# Patient Record
Sex: MALE | Race: WHITE | Employment: OTHER
[De-identification: names, ages, dates, MRNs, and addresses within clinical notes are randomized per-mention and may not be internally consistent; named-entity substitution may affect disease eponyms.]

---

## 2024-08-14 ENCOUNTER — TELEPHONE (OUTPATIENT)
Dept: FAMILY MEDICINE CLINIC | Facility: CLINIC | Age: 69
End: 2024-08-14

## 2024-08-14 NOTE — TELEPHONE ENCOUNTER
Spoke to patient, he chose to keep appt on 8/19 with Maritza as Dr. Gonzales is booking in October

## 2024-08-14 NOTE — TELEPHONE ENCOUNTER
----- Message from Mila BRICENO sent at 8/13/2024  3:44 PM EDT -----  Regarding: [facility] Appointment Request  E[facility] Appointment Request    Patient needs appointment for E[facility] Appointment Type: New to Provider.    Patient Requested Dates(s): as soon as possible   Patient Requested Time: 11:00 Morning   Provider Name: Lety Gonzales MD    Reason for Appointment Request: New Patient - No appointments available during search,patient want to book an appointment for new patient to establish care under doctor  Lety Gonzales MD  Just permanent PCP   --------------------------------------------------------------------------------------------------------------------------    Relationship to Patient: Covered Entity Sister Ms : cuate     Call Back Information: OK to leave message on voicemail  Preferred Call Back Number: [phone]

## 2024-08-28 ENCOUNTER — OFFICE VISIT (OUTPATIENT)
Dept: FAMILY MEDICINE CLINIC | Facility: CLINIC | Age: 69
End: 2024-08-28

## 2024-08-28 VITALS
HEIGHT: 68 IN | SYSTOLIC BLOOD PRESSURE: 133 MMHG | DIASTOLIC BLOOD PRESSURE: 78 MMHG | WEIGHT: 144.25 LBS | HEART RATE: 94 BPM | TEMPERATURE: 98.4 F | OXYGEN SATURATION: 95 % | BODY MASS INDEX: 21.86 KG/M2

## 2024-08-28 DIAGNOSIS — Z79.01 CURRENT USE OF LONG TERM ANTICOAGULATION: ICD-10-CM

## 2024-08-28 DIAGNOSIS — I10 ESSENTIAL HYPERTENSION: ICD-10-CM

## 2024-08-28 DIAGNOSIS — E78.2 MIXED HYPERLIPIDEMIA: ICD-10-CM

## 2024-08-28 DIAGNOSIS — N18.4 CKD (CHRONIC KIDNEY DISEASE) STAGE 4, GFR 15-29 ML/MIN (HCC): ICD-10-CM

## 2024-08-28 DIAGNOSIS — Z86.73 HISTORY OF CVA (CEREBROVASCULAR ACCIDENT) WITHOUT RESIDUAL DEFICITS: ICD-10-CM

## 2024-08-28 DIAGNOSIS — G57.93 NEUROPATHY OF BOTH FEET: ICD-10-CM

## 2024-08-28 DIAGNOSIS — N64.4 BREAST PAIN, LEFT: ICD-10-CM

## 2024-08-28 DIAGNOSIS — E11.22 CONTROLLED TYPE 2 DIABETES MELLITUS WITH STAGE 4 CHRONIC KIDNEY DISEASE, WITHOUT LONG-TERM CURRENT USE OF INSULIN (HCC): Primary | ICD-10-CM

## 2024-08-28 DIAGNOSIS — L97.909 ULCER OF LOWER EXTREMITY, UNSPECIFIED LATERALITY, UNSPECIFIED ULCER STAGE (HCC): ICD-10-CM

## 2024-08-28 DIAGNOSIS — E79.0 HYPERURICEMIA: ICD-10-CM

## 2024-08-28 DIAGNOSIS — I82.409 RECURRENT ACUTE DEEP VEIN THROMBOSIS (DVT) OF LOWER EXTREMITY, UNSPECIFIED LATERALITY (HCC): ICD-10-CM

## 2024-08-28 DIAGNOSIS — F17.209 TOBACCO USE DISORDER, CONTINUOUS: ICD-10-CM

## 2024-08-28 DIAGNOSIS — N62 GYNECOMASTIA: ICD-10-CM

## 2024-08-28 DIAGNOSIS — Z79.84 LONG TERM (CURRENT) USE OF ORAL HYPOGLYCEMIC DRUGS: ICD-10-CM

## 2024-08-28 DIAGNOSIS — N18.4 CONTROLLED TYPE 2 DIABETES MELLITUS WITH STAGE 4 CHRONIC KIDNEY DISEASE, WITHOUT LONG-TERM CURRENT USE OF INSULIN (HCC): Primary | ICD-10-CM

## 2024-08-28 DIAGNOSIS — G47.62 NOCTURNAL LEG CRAMPS: ICD-10-CM

## 2024-08-28 LAB
BASOPHILS # BLD: 0.1 K/UL (ref 0–0.2)
BASOPHILS NFR BLD: 1 % (ref 0–2)
DIFFERENTIAL METHOD BLD: ABNORMAL
EOSINOPHIL # BLD: 0.1 K/UL (ref 0–0.8)
EOSINOPHIL NFR BLD: 1 % (ref 0.5–7.8)
ERYTHROCYTE [DISTWIDTH] IN BLOOD BY AUTOMATED COUNT: 16.7 % (ref 11.9–14.6)
EST. AVERAGE GLUCOSE BLD GHB EST-MCNC: 141 MG/DL
HBA1C MFR BLD: 6.5 % (ref 0–5.6)
HCT VFR BLD AUTO: 43.2 % (ref 41.1–50.3)
HGB BLD-MCNC: 13.6 G/DL (ref 13.6–17.2)
IMM GRANULOCYTES # BLD AUTO: 0 K/UL (ref 0–0.5)
IMM GRANULOCYTES NFR BLD AUTO: 0 % (ref 0–5)
LYMPHOCYTES # BLD: 1.9 K/UL (ref 0.5–4.6)
LYMPHOCYTES NFR BLD: 26 % (ref 13–44)
MAGNESIUM SERPL-MCNC: 2.3 MG/DL (ref 1.8–2.4)
MCH RBC QN AUTO: 29.2 PG (ref 26.1–32.9)
MCHC RBC AUTO-ENTMCNC: 31.5 G/DL (ref 31.4–35)
MCV RBC AUTO: 92.9 FL (ref 82–102)
MONOCYTES # BLD: 0.5 K/UL (ref 0.1–1.3)
MONOCYTES NFR BLD: 7 % (ref 4–12)
NEUTS SEG # BLD: 4.9 K/UL (ref 1.7–8.2)
NEUTS SEG NFR BLD: 65 % (ref 43–78)
NRBC # BLD: 0 K/UL (ref 0–0.2)
PLATELET # BLD AUTO: 219 K/UL (ref 150–450)
PMV BLD AUTO: 10.3 FL (ref 9.4–12.3)
RBC # BLD AUTO: 4.65 M/UL (ref 4.23–5.6)
URATE SERPL-MCNC: 7.3 MG/DL (ref 3.9–8.2)
VIT B12 SERPL-MCNC: 373 PG/ML (ref 193–986)
WBC # BLD AUTO: 7.5 K/UL (ref 4.3–11.1)

## 2024-08-28 RX ORDER — SITAGLIPTIN 50 MG/1
50 TABLET, FILM COATED ORAL DAILY
COMMUNITY
Start: 2024-07-23

## 2024-08-28 RX ORDER — DAPAGLIFLOZIN 5 MG/1
5 TABLET, FILM COATED ORAL DAILY
COMMUNITY
Start: 2024-06-21

## 2024-08-28 RX ORDER — LISINOPRIL 2.5 MG/1
2.5 TABLET ORAL DAILY
COMMUNITY

## 2024-08-28 RX ORDER — ALLOPURINOL 100 MG/1
100 TABLET ORAL DAILY
COMMUNITY

## 2024-08-28 RX ORDER — TRAZODONE HYDROCHLORIDE 50 MG/1
50 TABLET, FILM COATED ORAL NIGHTLY
COMMUNITY

## 2024-08-28 RX ORDER — APIXABAN 2.5 MG/1
2.5 TABLET, FILM COATED ORAL 2 TIMES DAILY
COMMUNITY
Start: 2024-08-12

## 2024-08-28 RX ORDER — ATORVASTATIN CALCIUM 80 MG/1
80 TABLET, FILM COATED ORAL DAILY
COMMUNITY

## 2024-08-28 SDOH — ECONOMIC STABILITY: INCOME INSECURITY: HOW HARD IS IT FOR YOU TO PAY FOR THE VERY BASICS LIKE FOOD, HOUSING, MEDICAL CARE, AND HEATING?: NOT HARD AT ALL

## 2024-08-28 SDOH — ECONOMIC STABILITY: FOOD INSECURITY: WITHIN THE PAST 12 MONTHS, YOU WORRIED THAT YOUR FOOD WOULD RUN OUT BEFORE YOU GOT MONEY TO BUY MORE.: NEVER TRUE

## 2024-08-28 SDOH — ECONOMIC STABILITY: FOOD INSECURITY: WITHIN THE PAST 12 MONTHS, THE FOOD YOU BOUGHT JUST DIDN'T LAST AND YOU DIDN'T HAVE MONEY TO GET MORE.: NEVER TRUE

## 2024-08-28 ASSESSMENT — PATIENT HEALTH QUESTIONNAIRE - PHQ9
7. TROUBLE CONCENTRATING ON THINGS, SUCH AS READING THE NEWSPAPER OR WATCHING TELEVISION: NOT AT ALL
6. FEELING BAD ABOUT YOURSELF - OR THAT YOU ARE A FAILURE OR HAVE LET YOURSELF OR YOUR FAMILY DOWN: NOT AT ALL
10. IF YOU CHECKED OFF ANY PROBLEMS, HOW DIFFICULT HAVE THESE PROBLEMS MADE IT FOR YOU TO DO YOUR WORK, TAKE CARE OF THINGS AT HOME, OR GET ALONG WITH OTHER PEOPLE: SOMEWHAT DIFFICULT
SUM OF ALL RESPONSES TO PHQ QUESTIONS 1-9: 6
5. POOR APPETITE OR OVEREATING: NOT AT ALL
1. LITTLE INTEREST OR PLEASURE IN DOING THINGS: NEARLY EVERY DAY
SUM OF ALL RESPONSES TO PHQ QUESTIONS 1-9: 6
9. THOUGHTS THAT YOU WOULD BE BETTER OFF DEAD, OR OF HURTING YOURSELF: NOT AT ALL
SUM OF ALL RESPONSES TO PHQ QUESTIONS 1-9: 6
8. MOVING OR SPEAKING SO SLOWLY THAT OTHER PEOPLE COULD HAVE NOTICED. OR THE OPPOSITE, BEING SO FIGETY OR RESTLESS THAT YOU HAVE BEEN MOVING AROUND A LOT MORE THAN USUAL: NOT AT ALL
2. FEELING DOWN, DEPRESSED OR HOPELESS: MORE THAN HALF THE DAYS
3. TROUBLE FALLING OR STAYING ASLEEP: NOT AT ALL
4. FEELING TIRED OR HAVING LITTLE ENERGY: SEVERAL DAYS
SUM OF ALL RESPONSES TO PHQ QUESTIONS 1-9: 6
SUM OF ALL RESPONSES TO PHQ9 QUESTIONS 1 & 2: 5

## 2024-08-28 NOTE — PROGRESS NOTES
Chief Complaint   Patient presents with    New Patient     Wants to establish, numbness of right foot & left side toes x 6 months, lump in left breast-mammogram negative, painful when he bumps it       HISTORY OF PRESENT ILLNESS:  Sin Narayan is a very pleasant 69 y.o. male who presents as a new patient to establish care and follow up on several chronic medical conditions, including:     Type 2 DM- last A1C unknown. He is currently taking januvia and farxiga. He does not monitor glucose at home. Denies symptoms of hypoglycemia. He is overdue for eye exam. Endorses neuropathy symptoms in both feet, which he reports started earlier this year. He has CKD stage 4, which is managed by nephrology. 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 He is on long term anticoagulant therapy with eliquis. Reports having vein stripping on both legs for varicose veins.     Hypertension- stable. BP today in the office was 133/78. He is prescribed lisinopril and amlodipine but is only taking lisinopril currently as he was unsure about his prescribed medications. He denies CP, ENCINAS/SOB, palpitations, lower extremity edema, dizziness, syncopal episodes, regular ETOH use. He had a CVA in 2019 due to embolism of right middle cerebral artery. No residual deficits and is compliant with statin therapy and also on eliquis. He is a daily smoker.    Gout- has affected wrists in the past. He has been non-compliant with allopurinol therapy due to confusion regarding his medications. No gout flares in about 2 years. No hx of kidney stones    4. Breast pain- left- has gynecomastia. No change in size, nipple discharge, rashes. Mammogram performed in 01/22 and demonstrated bilateral gynecomastia. Not taking any medications known to cause gynecomastia. No drug use other than prescribed morphine for back pain       Accompanied by his sister today.     PAST MEDICAL HISTORY:

## 2024-09-04 PROBLEM — I82.409 RECURRENT ACUTE DEEP VEIN THROMBOSIS (DVT) OF LOWER EXTREMITY (HCC): Status: ACTIVE | Noted: 2024-09-04

## 2024-09-04 PROBLEM — E79.0 HYPERURICEMIA: Status: ACTIVE | Noted: 2024-09-04

## 2024-09-04 PROBLEM — N18.4 CONTROLLED TYPE 2 DIABETES MELLITUS WITH STAGE 4 CHRONIC KIDNEY DISEASE, WITHOUT LONG-TERM CURRENT USE OF INSULIN (HCC): Status: ACTIVE | Noted: 2024-09-04

## 2024-09-04 PROBLEM — E78.2 MIXED HYPERLIPIDEMIA: Status: ACTIVE | Noted: 2024-09-04

## 2024-09-04 PROBLEM — I10 ESSENTIAL HYPERTENSION: Status: ACTIVE | Noted: 2024-09-04

## 2024-09-04 PROBLEM — Z86.73 HISTORY OF CVA (CEREBROVASCULAR ACCIDENT) WITHOUT RESIDUAL DEFICITS: Status: ACTIVE | Noted: 2024-09-04

## 2024-09-04 PROBLEM — E11.22 CONTROLLED TYPE 2 DIABETES MELLITUS WITH STAGE 4 CHRONIC KIDNEY DISEASE, WITHOUT LONG-TERM CURRENT USE OF INSULIN (HCC): Status: ACTIVE | Noted: 2024-09-04

## 2024-09-04 ASSESSMENT — ENCOUNTER SYMPTOMS
VOMITING: 0
CONSTIPATION: 1
SHORTNESS OF BREATH: 0
BLOOD IN STOOL: 0
BACK PAIN: 1
COUGH: 0
ABDOMINAL PAIN: 0
NAUSEA: 0

## 2024-09-09 ENCOUNTER — OFFICE VISIT (OUTPATIENT)
Dept: FAMILY MEDICINE CLINIC | Facility: CLINIC | Age: 69
End: 2024-09-09
Payer: MEDICARE

## 2024-09-09 VITALS
TEMPERATURE: 97.9 F | HEIGHT: 68 IN | SYSTOLIC BLOOD PRESSURE: 130 MMHG | BODY MASS INDEX: 21.67 KG/M2 | WEIGHT: 143 LBS | OXYGEN SATURATION: 99 % | HEART RATE: 99 BPM | DIASTOLIC BLOOD PRESSURE: 70 MMHG

## 2024-09-09 DIAGNOSIS — E78.2 MIXED HYPERLIPIDEMIA: ICD-10-CM

## 2024-09-09 DIAGNOSIS — E11.22 CONTROLLED TYPE 2 DIABETES MELLITUS WITH STAGE 4 CHRONIC KIDNEY DISEASE, WITHOUT LONG-TERM CURRENT USE OF INSULIN (HCC): ICD-10-CM

## 2024-09-09 DIAGNOSIS — Z00.00 MEDICARE ANNUAL WELLNESS VISIT, SUBSEQUENT: Primary | ICD-10-CM

## 2024-09-09 DIAGNOSIS — N18.4 CONTROLLED TYPE 2 DIABETES MELLITUS WITH STAGE 4 CHRONIC KIDNEY DISEASE, WITHOUT LONG-TERM CURRENT USE OF INSULIN (HCC): ICD-10-CM

## 2024-09-09 DIAGNOSIS — I10 ESSENTIAL HYPERTENSION: ICD-10-CM

## 2024-09-09 DIAGNOSIS — N18.4 CKD (CHRONIC KIDNEY DISEASE) STAGE 4, GFR 15-29 ML/MIN (HCC): ICD-10-CM

## 2024-09-09 DIAGNOSIS — F17.210 CIGARETTE NICOTINE DEPENDENCE WITHOUT COMPLICATION: ICD-10-CM

## 2024-09-09 LAB
CHOLEST SERPL-MCNC: 181 MG/DL (ref 0–200)
HDLC SERPL-MCNC: 73 MG/DL (ref 40–60)
HDLC SERPL: 2.5 (ref 0–5)
LDLC SERPL CALC-MCNC: 91 MG/DL (ref 0–100)
TRIGL SERPL-MCNC: 84 MG/DL (ref 0–150)
VLDLC SERPL CALC-MCNC: 17 MG/DL (ref 6–23)

## 2024-09-09 PROCEDURE — G0439 PPPS, SUBSEQ VISIT: HCPCS | Performed by: FAMILY MEDICINE

## 2024-09-09 PROCEDURE — 2022F DILAT RTA XM EVC RTNOPTHY: CPT | Performed by: FAMILY MEDICINE

## 2024-09-09 PROCEDURE — 3078F DIAST BP <80 MM HG: CPT | Performed by: FAMILY MEDICINE

## 2024-09-09 PROCEDURE — G8420 CALC BMI NORM PARAMETERS: HCPCS | Performed by: FAMILY MEDICINE

## 2024-09-09 PROCEDURE — 4004F PT TOBACCO SCREEN RCVD TLK: CPT | Performed by: FAMILY MEDICINE

## 2024-09-09 PROCEDURE — 3074F SYST BP LT 130 MM HG: CPT | Performed by: FAMILY MEDICINE

## 2024-09-09 PROCEDURE — G8427 DOCREV CUR MEDS BY ELIG CLIN: HCPCS | Performed by: FAMILY MEDICINE

## 2024-09-09 PROCEDURE — 3017F COLORECTAL CA SCREEN DOC REV: CPT | Performed by: FAMILY MEDICINE

## 2024-09-09 PROCEDURE — 3044F HG A1C LEVEL LT 7.0%: CPT | Performed by: FAMILY MEDICINE

## 2024-09-09 PROCEDURE — 1123F ACP DISCUSS/DSCN MKR DOCD: CPT | Performed by: FAMILY MEDICINE

## 2024-09-09 PROCEDURE — 99214 OFFICE O/P EST MOD 30 MIN: CPT | Performed by: FAMILY MEDICINE

## 2024-09-09 ASSESSMENT — ENCOUNTER SYMPTOMS
SHORTNESS OF BREATH: 0
WHEEZING: 0
BACK PAIN: 0
CHEST TIGHTNESS: 0
COUGH: 0

## 2024-09-09 ASSESSMENT — PATIENT HEALTH QUESTIONNAIRE - PHQ9
1. LITTLE INTEREST OR PLEASURE IN DOING THINGS: NOT AT ALL
2. FEELING DOWN, DEPRESSED OR HOPELESS: NOT AT ALL
SUM OF ALL RESPONSES TO PHQ QUESTIONS 1-9: 0
SUM OF ALL RESPONSES TO PHQ9 QUESTIONS 1 & 2: 0
SUM OF ALL RESPONSES TO PHQ QUESTIONS 1-9: 0

## 2024-09-09 ASSESSMENT — LIFESTYLE VARIABLES
HOW MANY STANDARD DRINKS CONTAINING ALCOHOL DO YOU HAVE ON A TYPICAL DAY: PATIENT DOES NOT DRINK
HOW OFTEN DO YOU HAVE A DRINK CONTAINING ALCOHOL: NEVER

## 2024-09-20 ENCOUNTER — HOSPITAL ENCOUNTER (OUTPATIENT)
Dept: CT IMAGING | Age: 69
Discharge: HOME | End: 2024-09-23
Attending: FAMILY MEDICINE
Payer: MEDICARE

## 2024-09-20 DIAGNOSIS — F17.210 CIGARETTE NICOTINE DEPENDENCE WITHOUT COMPLICATION: ICD-10-CM

## 2024-09-20 PROCEDURE — 71271 CT THORAX LUNG CANCER SCR C-: CPT

## 2024-09-22 DIAGNOSIS — R91.1 PULMONARY NODULE, RIGHT: Primary | ICD-10-CM

## 2024-09-22 DIAGNOSIS — N28.89 RENAL MASS, LEFT: ICD-10-CM

## 2024-09-23 ENCOUNTER — TELEPHONE (OUTPATIENT)
Dept: FAMILY MEDICINE CLINIC | Facility: CLINIC | Age: 69
End: 2024-09-23

## 2024-09-24 ENCOUNTER — TELEPHONE (OUTPATIENT)
Dept: FAMILY MEDICINE CLINIC | Facility: CLINIC | Age: 69
End: 2024-09-24

## 2024-09-24 ENCOUNTER — HOSPITAL ENCOUNTER (OUTPATIENT)
Dept: ULTRASOUND IMAGING | Age: 69
Discharge: HOME | End: 2024-09-27
Payer: MEDICARE

## 2024-09-24 DIAGNOSIS — N28.89 RENAL MASS, LEFT: ICD-10-CM

## 2024-09-24 DIAGNOSIS — L97.909 ULCER OF LOWER EXTREMITY, UNSPECIFIED LATERALITY, UNSPECIFIED ULCER STAGE (HCC): ICD-10-CM

## 2024-09-24 DIAGNOSIS — F17.209 TOBACCO USE DISORDER, CONTINUOUS: ICD-10-CM

## 2024-09-24 DIAGNOSIS — G57.93 NEUROPATHY OF BOTH FEET: ICD-10-CM

## 2024-09-24 LAB
ANION GAP SERPL CALC-SCNC: 12 MMOL/L (ref 9–18)
BUN SERPL-MCNC: 42 MG/DL (ref 8–23)
CALCIUM SERPL-MCNC: 10.4 MG/DL (ref 8.8–10.2)
CHLORIDE SERPL-SCNC: 106 MMOL/L (ref 98–107)
CO2 SERPL-SCNC: 23 MMOL/L (ref 20–28)
CREAT SERPL-MCNC: 2.19 MG/DL (ref 0.8–1.3)
GLUCOSE SERPL-MCNC: 116 MG/DL (ref 70–99)
POTASSIUM SERPL-SCNC: 4.3 MMOL/L (ref 3.5–5.1)
SODIUM SERPL-SCNC: 140 MMOL/L (ref 136–145)

## 2024-09-24 PROCEDURE — 93925 LOWER EXTREMITY STUDY: CPT

## 2024-09-24 PROCEDURE — 93925 LOWER EXTREMITY STUDY: CPT | Performed by: RADIOLOGY

## 2024-09-26 ENCOUNTER — TELEPHONE (OUTPATIENT)
Dept: PULMONOLOGY | Age: 69
End: 2024-09-26

## 2024-09-30 ENCOUNTER — OFFICE VISIT (OUTPATIENT)
Dept: PULMONOLOGY | Age: 69
End: 2024-09-30
Payer: MEDICARE

## 2024-09-30 VITALS
BODY MASS INDEX: 21.67 KG/M2 | RESPIRATION RATE: 20 BRPM | OXYGEN SATURATION: 96 % | HEIGHT: 68 IN | DIASTOLIC BLOOD PRESSURE: 80 MMHG | SYSTOLIC BLOOD PRESSURE: 120 MMHG | TEMPERATURE: 98 F | WEIGHT: 143 LBS | HEART RATE: 65 BPM

## 2024-09-30 DIAGNOSIS — J44.9 CHRONIC OBSTRUCTIVE PULMONARY DISEASE, UNSPECIFIED COPD TYPE (HCC): ICD-10-CM

## 2024-09-30 DIAGNOSIS — F17.210 NICOTINE DEPENDENCE, CIGARETTES, UNCOMPLICATED: ICD-10-CM

## 2024-09-30 DIAGNOSIS — R91.1 PULMONARY NODULE: Primary | ICD-10-CM

## 2024-09-30 LAB
EXPIRATORY TIME: NORMAL
FEF 25-75% %PRED-PRE: NORMAL
FEF 25-75% PRED: NORMAL
FEF 25-75-PRE: NORMAL
FEV1 %PRED-PRE: 57 %
FEV1 PRED: NORMAL
FEV1/FVC %PRED-PRE: NORMAL
FEV1/FVC PRED: NORMAL
FEV1/FVC: 55 %
FEV1: 1.76 L
FVC %PRED-PRE: 69 %
FVC PRED: NORMAL
FVC: 2.77 L
PEF %PRED-PRE: NORMAL
PEF PRED: NORMAL
PEF-PRE: NORMAL

## 2024-09-30 PROCEDURE — 99204 OFFICE O/P NEW MOD 45 MIN: CPT | Performed by: INTERNAL MEDICINE

## 2024-09-30 PROCEDURE — 3079F DIAST BP 80-89 MM HG: CPT | Performed by: INTERNAL MEDICINE

## 2024-09-30 PROCEDURE — 99406 BEHAV CHNG SMOKING 3-10 MIN: CPT | Performed by: INTERNAL MEDICINE

## 2024-09-30 PROCEDURE — 1123F ACP DISCUSS/DSCN MKR DOCD: CPT | Performed by: INTERNAL MEDICINE

## 2024-09-30 PROCEDURE — 3023F SPIROM DOC REV: CPT | Performed by: INTERNAL MEDICINE

## 2024-09-30 PROCEDURE — G8427 DOCREV CUR MEDS BY ELIG CLIN: HCPCS | Performed by: INTERNAL MEDICINE

## 2024-09-30 PROCEDURE — 3017F COLORECTAL CA SCREEN DOC REV: CPT | Performed by: INTERNAL MEDICINE

## 2024-09-30 PROCEDURE — 4004F PT TOBACCO SCREEN RCVD TLK: CPT | Performed by: INTERNAL MEDICINE

## 2024-09-30 PROCEDURE — G8420 CALC BMI NORM PARAMETERS: HCPCS | Performed by: INTERNAL MEDICINE

## 2024-09-30 PROCEDURE — 3074F SYST BP LT 130 MM HG: CPT | Performed by: INTERNAL MEDICINE

## 2024-09-30 PROCEDURE — 94010 BREATHING CAPACITY TEST: CPT | Performed by: INTERNAL MEDICINE

## 2024-09-30 RX ORDER — FLUTICASONE FUROATE, UMECLIDINIUM BROMIDE AND VILANTEROL TRIFENATATE 100; 62.5; 25 UG/1; UG/1; UG/1
1 POWDER RESPIRATORY (INHALATION) DAILY
Qty: 1 EACH | Refills: 11 | Status: SHIPPED | OUTPATIENT
Start: 2024-09-30

## 2024-09-30 ASSESSMENT — PULMONARY FUNCTION TESTS
FVC_PERCENT_PREDICTED_PRE: 69
FEV1_PERCENT_PREDICTED_PRE: 57
FVC: 2.77
FEV1: 1.76
FEV1/FVC: 55

## 2024-09-30 NOTE — PROGRESS NOTES
Years of education: Not on file    Highest education level: Not on file   Occupational History    Not on file   Tobacco Use    Smoking status: Every Day     Current packs/day: 1.00     Average packs/day: 1 pack/day for 53.7 years (53.7 ttl pk-yrs)     Types: Cigarettes     Start date: 1971     Passive exposure: Current    Smokeless tobacco: Never   Substance and Sexual Activity    Alcohol use: Not Currently    Drug use: Never    Sexual activity: Not Currently     Partners: Female   Other Topics Concern    Not on file   Social History Narrative    Not on file     Social Determinants of Health     Financial Resource Strain: Low Risk  (8/28/2024)    Overall Financial Resource Strain (CARDIA)     Difficulty of Paying Living Expenses: Not hard at all   Food Insecurity: No Food Insecurity (8/28/2024)    Hunger Vital Sign     Worried About Running Out of Food in the Last Year: Never true     Ran Out of Food in the Last Year: Never true   Transportation Needs: Unknown (8/28/2024)    PRAPARE - Transportation     Lack of Transportation (Medical): Not on file     Lack of Transportation (Non-Medical): No   Physical Activity: Inactive (9/9/2024)    Exercise Vital Sign     Days of Exercise per Week: 0 days     Minutes of Exercise per Session: 0 min   Stress: Not on file   Social Connections: Unknown (3/20/2021)    Received from [facility]    Social Connections     Frequency of Communication with Friends and Family: Not asked     Frequency of Social Gatherings with Friends and Family: Not asked   Intimate Partner Violence: Unknown (3/20/2021)    Received from [facility]    Intimate Partner Violence     Fear of Current or Ex-Partner: Not asked     Emotionally Abused: Not asked     Physically Abused: Not asked     Sexually Abused: Not asked   Housing Stability: Unknown (8/28/2024)    Housing Stability Vital Sign     Unable to Pay for Housing in the Last Year: Not on file     Number of Times

## 2024-09-30 NOTE — PROGRESS NOTES
with recurrent DVT's (possible factor V liden) from chart record and recurrent ulcers on his legs. He is on long term anticoagulant therapy with eliquis. Reports having vein stripping on both legs for varicose veins.   Last eye appt - 2 yrs ago.      -Hypertension- stable. BP today in the office was 133/78. He is prescribed lisinopril - stopped amlodipine - but is only taking lisinopril currently as he was unsure about his prescribed medications. He denies CP, ENCINAS/SOB, palpitations, lower extremity edema, dizziness, syncopal episodes, No regular ETOH use. He had a CVA in 2019 due to embolism of right middle cerebral artery. No residual deficits and is compliant with statin therapy and also on eliquis. He is a daily smoker.     -       Gout- has affected wrists in the past. He has been non-compliant with allopurinol therapy due to confusion regarding his medications.  Per renal he is to continue allopurinol.  Recent UA level at 7.3 - Goal 6.5.  No gout flares in about 2 years. No hx of kidney stones     -Breast pain- left- has gynecomastia. No change in size, nipple discharge, rashes. Mammogram performed in 01/22 and demonstrated bilateral gynecomastia. Not taking any medications known to cause gynecomastia. No drug use other than prescribed morphine for back pain   Was referred to Surgery. Has upcoming appt      - Hx of CVA- He had a CVA in 2019 due to embolism of right middle cerebral artery. No residual deficits and is compliant with statin therapy and also on eliquis. He is a daily smoker.     -Hyperlipidemia  - ON lipitor 80 mg- no recent lipid levels     -CKD - stage 4 - Egfr at 27 -  Followed by renal. -Last seen in Aug - Followed Q3 mos.    Left kidney not functioning based on prior renal artery duplex.  No nsaids, no cola, NO tea.  Just water.                          Review of Systems   Constitutional:  Negative for activity change and appetite change.   Respiratory:  Negative for cough, shortness of breath

## 2024-10-01 ENCOUNTER — OFFICE VISIT (OUTPATIENT)
Dept: FAMILY MEDICINE CLINIC | Facility: CLINIC | Age: 69
End: 2024-10-01
Payer: MEDICARE

## 2024-10-01 DIAGNOSIS — F17.218 CIGARETTE NICOTINE DEPENDENCE WITH OTHER NICOTINE-INDUCED DISORDER: Primary | ICD-10-CM

## 2024-10-01 DIAGNOSIS — J44.9 CHRONIC OBSTRUCTIVE PULMONARY DISEASE, UNSPECIFIED COPD TYPE (HCC): ICD-10-CM

## 2024-10-01 DIAGNOSIS — G57.93 NEUROPATHY OF BOTH FEET: Primary | ICD-10-CM

## 2024-10-01 DIAGNOSIS — R91.8 LUNG NODULES: ICD-10-CM

## 2024-10-01 PROCEDURE — 99213 OFFICE O/P EST LOW 20 MIN: CPT | Performed by: FAMILY MEDICINE

## 2024-10-01 RX ORDER — BUPROPION HYDROCHLORIDE 150 MG/1
150 TABLET ORAL EVERY MORNING
Qty: 90 TABLET | Refills: 1 | Status: SHIPPED | OUTPATIENT
Start: 2024-10-01

## 2024-10-01 RX ORDER — POLYETHYLENE GLYCOL 3350 17 G
2 POWDER IN PACKET (EA) ORAL PRN
Qty: 100 EACH | Refills: 3 | Status: SHIPPED | OUTPATIENT
Start: 2024-10-01

## 2024-10-01 RX ORDER — NICOTINE 21 MG/24HR
1 PATCH, TRANSDERMAL 24 HOURS TRANSDERMAL DAILY
Qty: 42 PATCH | Refills: 0 | Status: SHIPPED | OUTPATIENT
Start: 2024-10-01 | End: 2024-11-12

## 2024-10-01 ASSESSMENT — ENCOUNTER SYMPTOMS
COUGH: 0
WHEEZING: 0
SHORTNESS OF BREATH: 0

## 2024-10-10 ENCOUNTER — HOSPITAL ENCOUNTER (OUTPATIENT)
Dept: MRI IMAGING | Age: 69
Discharge: HOME | End: 2024-10-13
Attending: FAMILY MEDICINE
Payer: MEDICARE

## 2024-10-10 DIAGNOSIS — N28.89 RENAL MASS, LEFT: ICD-10-CM

## 2024-10-10 PROCEDURE — 74183 MRI ABD W/O CNTR FLWD CNTR: CPT

## 2024-10-10 PROCEDURE — 6360000004 HC RX CONTRAST MEDICATION: Performed by: FAMILY MEDICINE

## 2024-10-10 PROCEDURE — A9579 GAD-BASE MR CONTRAST NOS,1ML: HCPCS | Performed by: FAMILY MEDICINE

## 2024-10-10 RX ADMIN — GADOTERIDOL 12 ML: 279.3 INJECTION, SOLUTION INTRAVENOUS at 16:17

## 2024-10-16 ENCOUNTER — OFFICE VISIT (OUTPATIENT)
Dept: SURGERY | Age: 69
End: 2024-10-16
Payer: MEDICARE

## 2024-10-16 VITALS — WEIGHT: 143 LBS | BODY MASS INDEX: 21.67 KG/M2 | HEIGHT: 68 IN

## 2024-10-16 DIAGNOSIS — N64.4 BREAST PAIN, LEFT: Primary | ICD-10-CM

## 2024-10-16 DIAGNOSIS — N63.42 SUBAREOLAR MASS OF LEFT BREAST: ICD-10-CM

## 2024-10-16 PROBLEM — N63.20 LEFT BREAST MASS: Status: ACTIVE | Noted: 2024-10-16

## 2024-10-16 PROCEDURE — 1123F ACP DISCUSS/DSCN MKR DOCD: CPT | Performed by: SURGERY

## 2024-10-16 PROCEDURE — 99204 OFFICE O/P NEW MOD 45 MIN: CPT | Performed by: SURGERY

## 2024-10-16 PROCEDURE — 4004F PT TOBACCO SCREEN RCVD TLK: CPT | Performed by: SURGERY

## 2024-10-16 PROCEDURE — G8484 FLU IMMUNIZE NO ADMIN: HCPCS | Performed by: SURGERY

## 2024-10-16 PROCEDURE — 3017F COLORECTAL CA SCREEN DOC REV: CPT | Performed by: SURGERY

## 2024-10-16 PROCEDURE — G8420 CALC BMI NORM PARAMETERS: HCPCS | Performed by: SURGERY

## 2024-10-16 PROCEDURE — G8427 DOCREV CUR MEDS BY ELIG CLIN: HCPCS | Performed by: SURGERY

## 2024-10-16 NOTE — PROGRESS NOTES
surgery indicated so far              Level of MDM (2/3 elements below)  Number and Complexity of Problems Addressed Amount and/or Complexity of Data to be Reviewed and Analyzed  *Each unique test, order, or document contributes to the combination of 2 or combination of 3 in Category 1 below. Risk of Complications and/or Morbidity or Mortality of pt Management     84500  22915 SF Minimal  ?1self-limited or minor problem Minimal or none Minimal risk of morbidity from additional diagnostic testing or Rx   63774  24711 Low Low  ?2or more self-limited or minor problems;    or  ?1stable chronic illness;    or  ?1acute, uncomplicated illness or injury   Limited  (Must meet the requirements of at least 1 of the 2 categories)  Category 1: Tests and documents   ?Any combination of 2 from the following:  ?Review of prior external note(s) from each unique source*;  ?review of the result(s) of each unique test*;   ?ordering of each unique test*    or   Category 2: Assessment requiring an independent historian(s)  (For the categories of independent interpretation of tests and discussion of management or test interpretation, see moderate or high) Low risk of morbidity from additional diagnostic testing or treatment     18946  44750 Mod Moderate  ?1or more chronic illnesses with exacerbation, progression, or side effects of treatment;    or  ?2or more stable chronic illnesses;    or  ?1undiagnosed new problem with uncertain prognosis;    or  ?1acute illness with systemic symptoms;    or  ?1acute complicated injury   Moderate  (Must meet the requirements of at least 1 out of 3 categories)  Category 1: Tests, documents, or independent historian(s)  ?Any combination of 3 from the following:   ?Review of prior external note(s) from each unique source*;  ?Review of the result(s) of each unique test*;  ?Ordering of each unique test*;  ?Assessment requiring an independent historian(s)    or  Category 2: Independent interpretation of

## 2024-10-22 ENCOUNTER — OFFICE VISIT (OUTPATIENT)
Dept: FAMILY MEDICINE CLINIC | Facility: CLINIC | Age: 69
End: 2024-10-22
Payer: MEDICARE

## 2024-10-22 VITALS
BODY MASS INDEX: 21.67 KG/M2 | OXYGEN SATURATION: 96 % | SYSTOLIC BLOOD PRESSURE: 124 MMHG | HEART RATE: 58 BPM | DIASTOLIC BLOOD PRESSURE: 74 MMHG | WEIGHT: 143 LBS | HEIGHT: 68 IN | TEMPERATURE: 98 F

## 2024-10-22 DIAGNOSIS — N28.1 BILATERAL RENAL CYSTS: ICD-10-CM

## 2024-10-22 DIAGNOSIS — N62 GYNECOMASTIA: ICD-10-CM

## 2024-10-22 DIAGNOSIS — R91.8 LUNG NODULES: Primary | ICD-10-CM

## 2024-10-22 DIAGNOSIS — F17.219 NICOTINE DEPENDENCE, CIGARETTES, W UNSP DISORDERS: ICD-10-CM

## 2024-10-22 PROCEDURE — 1123F ACP DISCUSS/DSCN MKR DOCD: CPT | Performed by: FAMILY MEDICINE

## 2024-10-22 PROCEDURE — 3078F DIAST BP <80 MM HG: CPT | Performed by: FAMILY MEDICINE

## 2024-10-22 PROCEDURE — 99213 OFFICE O/P EST LOW 20 MIN: CPT | Performed by: FAMILY MEDICINE

## 2024-10-22 PROCEDURE — 4004F PT TOBACCO SCREEN RCVD TLK: CPT | Performed by: FAMILY MEDICINE

## 2024-10-22 PROCEDURE — G8420 CALC BMI NORM PARAMETERS: HCPCS | Performed by: FAMILY MEDICINE

## 2024-10-22 PROCEDURE — G8427 DOCREV CUR MEDS BY ELIG CLIN: HCPCS | Performed by: FAMILY MEDICINE

## 2024-10-22 PROCEDURE — G8484 FLU IMMUNIZE NO ADMIN: HCPCS | Performed by: FAMILY MEDICINE

## 2024-10-22 PROCEDURE — 1159F MED LIST DOCD IN RCRD: CPT | Performed by: FAMILY MEDICINE

## 2024-10-22 PROCEDURE — 3074F SYST BP LT 130 MM HG: CPT | Performed by: FAMILY MEDICINE

## 2024-10-22 PROCEDURE — 3017F COLORECTAL CA SCREEN DOC REV: CPT | Performed by: FAMILY MEDICINE

## 2024-10-22 ASSESSMENT — ENCOUNTER SYMPTOMS
COUGH: 0
WHEEZING: 0
SHORTNESS OF BREATH: 0

## 2024-10-22 NOTE — PROGRESS NOTES
him in his last visit.  He has not started it yet.  Discussed starting that 2 weeks before he gets his patches or lozenges.  He already has had a CVA and strongly recommended he quit smoking.              Type 2 DM- last A1C 6.5 . He is currently taking januvia and farxiga. He does not monitor glucose at home. Denies symptoms of hypoglycemia. He is overdue for eye exam. Endorses neuropathy symptoms in both feet, which he reports started earlier this year. He has CKD stage 4, which is managed by nephrology. 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 He is on long term anticoagulant therapy with eliquis. Reports having vein stripping on both legs for varicose veins.   Last eye appt - 2 yrs ago.      .Hypertension- stable. BP today in the office was at goal. . He is prescribed lisinopril - stopped amlodipine - but is only taking lisinopril currently as he was unsure about his prescribed medications. He denies CP, ENCINAS/SOB, palpitations, lower extremity edema, dizziness, syncopal episodes, No regular ETOH use. He had a CVA in 2019 due to embolism of right middle cerebral artery. No residual deficits and is compliant with statin therapy and also on eliquis. He is a daily smoker.              Gout- has affected wrists in the past. He has been non-compliant with allopurinol therapy due to confusion regarding his medications.  Per renal he is to continue allopurinol.  Recent UA level at 7.3 - Goal 6.5.  No gout flares in about 2 years. No hx of kidney stones     Breast pain- left- has gynecomastia. No change in size, nipple discharge, rashes. Mammogram performed in 01/22 and demonstrated bilateral gynecomastia. Not taking any medications known to cause gynecomastia. No drug use other than prescribed morphine for back pain   Was referred to Surgery. Seeing Dr Chavira now and has follow-up diagnostic mammogram and ultrasound scheduled.      Hx of CVA- He

## 2024-10-22 NOTE — PATIENT INSTRUCTIONS
Try getting nicotine patches 21 mg  - take the 21 mg x 6 weeks then drop down to the 14 mg patches and take them for a month. From there you can drop down to 7 mg patch.     Lozenges - try the 2m lozenges every 2 hrs as needed for smoking cessation.  Can with the patches.     Start wellbutrin for your smoking cessation. Take in the AM.  Can help stop the cravings. Start about 2 wks prior to stopping smoking

## 2024-11-08 NOTE — TELEPHONE ENCOUNTER
FARXIGA 5 MG tablet      Also needs his BP med changed, he had a allergic reaction to it last night & had to go to hospital about it.     [facility]   [facility] in [facility]

## 2024-11-09 RX ORDER — DAPAGLIFLOZIN 5 MG/1
5 TABLET, FILM COATED ORAL DAILY
Qty: 90 TABLET | Refills: 3 | Status: SHIPPED | OUTPATIENT
Start: 2024-11-09

## 2024-11-11 DIAGNOSIS — I10 ESSENTIAL HYPERTENSION: Primary | ICD-10-CM

## 2024-11-11 RX ORDER — AMLODIPINE BESYLATE 2.5 MG/1
2.5 TABLET ORAL DAILY
Qty: 90 TABLET | Refills: 1 | Status: SHIPPED | OUTPATIENT
Start: 2024-11-11

## 2024-11-14 ENCOUNTER — HOSPITAL ENCOUNTER (OUTPATIENT)
Dept: MAMMOGRAPHY | Age: 69
Discharge: HOME | End: 2024-11-17
Attending: SURGERY
Payer: MEDICARE

## 2024-11-14 DIAGNOSIS — N63.42 SUBAREOLAR MASS OF LEFT BREAST: ICD-10-CM

## 2024-11-14 DIAGNOSIS — N64.4 BREAST PAIN, LEFT: ICD-10-CM

## 2024-11-14 PROCEDURE — G0279 TOMOSYNTHESIS, MAMMO: HCPCS

## 2024-11-25 ENCOUNTER — NURSE ONLY (OUTPATIENT)
Dept: FAMILY MEDICINE CLINIC | Facility: CLINIC | Age: 69
End: 2024-11-25

## 2024-11-25 VITALS — HEART RATE: 62 BPM | DIASTOLIC BLOOD PRESSURE: 68 MMHG | SYSTOLIC BLOOD PRESSURE: 136 MMHG

## 2024-11-25 DIAGNOSIS — I10 ESSENTIAL HYPERTENSION: Primary | ICD-10-CM

## 2024-12-04 ENCOUNTER — OFFICE VISIT (OUTPATIENT)
Dept: SURGERY | Age: 69
End: 2024-12-04
Payer: MEDICARE

## 2024-12-04 VITALS — HEIGHT: 68 IN | BODY MASS INDEX: 21.74 KG/M2

## 2024-12-04 DIAGNOSIS — N64.4 BREAST PAIN, LEFT: Primary | ICD-10-CM

## 2024-12-04 DIAGNOSIS — N62 GYNECOMASTIA, MALE: ICD-10-CM

## 2024-12-04 DIAGNOSIS — N63.25 MASS OVERLAPPING MULTIPLE QUADRANTS OF LEFT BREAST: ICD-10-CM

## 2024-12-04 PROCEDURE — 1123F ACP DISCUSS/DSCN MKR DOCD: CPT | Performed by: SURGERY

## 2024-12-04 PROCEDURE — G8427 DOCREV CUR MEDS BY ELIG CLIN: HCPCS | Performed by: SURGERY

## 2024-12-04 PROCEDURE — 3017F COLORECTAL CA SCREEN DOC REV: CPT | Performed by: SURGERY

## 2024-12-04 PROCEDURE — 1159F MED LIST DOCD IN RCRD: CPT | Performed by: SURGERY

## 2024-12-04 PROCEDURE — 99214 OFFICE O/P EST MOD 30 MIN: CPT | Performed by: SURGERY

## 2024-12-04 PROCEDURE — 4004F PT TOBACCO SCREEN RCVD TLK: CPT | Performed by: SURGERY

## 2024-12-04 PROCEDURE — G8420 CALC BMI NORM PARAMETERS: HCPCS | Performed by: SURGERY

## 2024-12-04 PROCEDURE — G8484 FLU IMMUNIZE NO ADMIN: HCPCS | Performed by: SURGERY

## 2024-12-04 PROCEDURE — 1160F RVW MEDS BY RX/DR IN RCRD: CPT | Performed by: SURGERY

## 2024-12-04 NOTE — PROGRESS NOTES
H&P/Consult Note/Progress Note/Office Note:   Sin Narayan  MRN: [id number]  :1955  Age:69 y.o.    HPI: Sin Narayan is a 69 y.o. male who was referred for evaluation of left breast fullness and pain.    He reports a 2-3 yr h/o gynecomastia which was evaluated with imaging in 2022 as shown below.  He reports in approx 2024 he began to have episodes of left breast pain.    He denies nipple discharge.  No prior breast surgery.  No known liver disease.     He reports about a 10-year period where he was a fairly heavy drinker.  He does not take spironolactone.      Mother with history of breast cancer (80s)  Sister with breast cancer (60s)   Daughter with (50s)         22 MALE BILATERAL DIGITAL DIAGNOSTIC MAMMOGRAM 3D/2D AND TARGETED RIGHT US   No prior exams were available for comparison.     Real-time ultrasound of the right breast upper outer quadrant and retroareolar regions was performed.      Digital mammography views were performed including tomosynthesis.      No mammographic or sonographic abnormalities are noted in the area of swelling in the right breast.   There is increased density in both retroareolar regions.   This finding is consistent with the diagnosis of bilateral gynecomastia more prominent on the right.      No significant masses, calcifications, or other findings are seen in either breast on the mammogram or right ultrasound.      IMPRESSION: BENIGN, ULTRASOUND BENIGN   No mammographic or sonographic abnormalities are noted in the area of swelling in the right breast.   Clinical followup and management are recommended. Bilateral gynecomastia.    No mammographic or sonographic evidence of malignancy.         24 bilat Dx mammo  Hx: Breast pain and swelling, male patient     Mother with history of breast cancer in her 80s.   Sister with history of breast cancer in her 60s.   Daughter with history of breast cancer in her 50s.      COMPARISON: 2022

## 2024-12-09 RX ORDER — SITAGLIPTIN 50 MG/1
50 TABLET, FILM COATED ORAL DAILY
Qty: 90 TABLET | Refills: 3 | Status: SHIPPED | OUTPATIENT
Start: 2024-12-09

## 2024-12-09 NOTE — TELEPHONE ENCOUNTER
Pharmacy  [id number] - [place], [place] - [address]     Med refill  JANUVIA 50 MG tablet

## 2024-12-11 ENCOUNTER — OFFICE VISIT (OUTPATIENT)
Dept: ENDOCRINOLOGY | Age: 69
End: 2024-12-11
Payer: MEDICARE

## 2024-12-11 VITALS
DIASTOLIC BLOOD PRESSURE: 62 MMHG | RESPIRATION RATE: 18 BRPM | OXYGEN SATURATION: 95 % | WEIGHT: 144 LBS | SYSTOLIC BLOOD PRESSURE: 88 MMHG | BODY MASS INDEX: 21.82 KG/M2 | HEART RATE: 76 BPM | HEIGHT: 68 IN

## 2024-12-11 DIAGNOSIS — E21.3 HYPERPARATHYROIDISM, UNSPECIFIED (HCC): ICD-10-CM

## 2024-12-11 DIAGNOSIS — N18.4 STAGE 4 CHRONIC KIDNEY DISEASE (HCC): ICD-10-CM

## 2024-12-11 DIAGNOSIS — R63.4 WEIGHT LOSS: ICD-10-CM

## 2024-12-11 DIAGNOSIS — N62 GYNECOMASTIA: Primary | ICD-10-CM

## 2024-12-11 DIAGNOSIS — N62 GYNECOMASTIA: ICD-10-CM

## 2024-12-11 DIAGNOSIS — E21.0 PRIMARY HYPERPARATHYROIDISM (HCC): ICD-10-CM

## 2024-12-11 DIAGNOSIS — I82.401 RECURRENT ACUTE DEEP VEIN THROMBOSIS (DVT) OF RIGHT LOWER EXTREMITY (HCC): ICD-10-CM

## 2024-12-11 LAB
25(OH)D3 SERPL-MCNC: 33.4 NG/ML (ref 30–100)
ALBUMIN SERPL-MCNC: 3.7 G/DL (ref 3.2–4.6)
ALBUMIN/GLOB SERPL: 1.1 (ref 1–1.9)
ALP SERPL-CCNC: 123 U/L (ref 40–129)
ALT SERPL-CCNC: 25 U/L (ref 8–55)
ANION GAP SERPL CALC-SCNC: 12 MMOL/L (ref 7–16)
AST SERPL-CCNC: 29 U/L (ref 15–37)
BILIRUB SERPL-MCNC: 0.3 MG/DL (ref 0–1.2)
BUN SERPL-MCNC: 41 MG/DL (ref 8–23)
CA-I BLD-MCNC: 5.23 MG/DL (ref 4–5.2)
CALCIUM SERPL-MCNC: 10.5 MG/DL (ref 8.8–10.2)
CALCIUM SERPL-MCNC: 10.5 MG/DL (ref 8.8–10.2)
CHLORIDE SERPL-SCNC: 103 MMOL/L (ref 98–107)
CO2 SERPL-SCNC: 26 MMOL/L (ref 20–29)
CREAT SERPL-MCNC: 2.46 MG/DL (ref 0.8–1.3)
ESTRADIOL SERPL-MCNC: 29.4 PG/ML
FSH SERPL-ACNC: 9.6 MIU/ML
GLOBULIN SER CALC-MCNC: 3.2 G/DL (ref 2.3–3.5)
GLUCOSE SERPL-MCNC: 109 MG/DL (ref 70–99)
LH SERPL-ACNC: 10.1 MIU/ML
PHOSPHATE SERPL-MCNC: 2.6 MG/DL (ref 2.5–4.5)
POTASSIUM SERPL-SCNC: 4.7 MMOL/L (ref 3.5–5.1)
PROLACTIN SERPL-MCNC: 13.8 NG/ML (ref 4–15.2)
PROT SERPL-MCNC: 6.9 G/DL (ref 6.3–8.2)
PTH-INTACT SERPL-MCNC: 193 PG/ML (ref 15–65)
SODIUM SERPL-SCNC: 140 MMOL/L (ref 136–145)
TSH W FREE THYROID IF ABNORMAL: 3.09 UIU/ML (ref 0.27–4.2)

## 2024-12-11 PROCEDURE — G8420 CALC BMI NORM PARAMETERS: HCPCS | Performed by: INTERNAL MEDICINE

## 2024-12-11 PROCEDURE — G8427 DOCREV CUR MEDS BY ELIG CLIN: HCPCS | Performed by: INTERNAL MEDICINE

## 2024-12-11 PROCEDURE — 3074F SYST BP LT 130 MM HG: CPT | Performed by: INTERNAL MEDICINE

## 2024-12-11 PROCEDURE — 3017F COLORECTAL CA SCREEN DOC REV: CPT | Performed by: INTERNAL MEDICINE

## 2024-12-11 PROCEDURE — G8484 FLU IMMUNIZE NO ADMIN: HCPCS | Performed by: INTERNAL MEDICINE

## 2024-12-11 PROCEDURE — 99204 OFFICE O/P NEW MOD 45 MIN: CPT | Performed by: INTERNAL MEDICINE

## 2024-12-11 PROCEDURE — 4004F PT TOBACCO SCREEN RCVD TLK: CPT | Performed by: INTERNAL MEDICINE

## 2024-12-11 PROCEDURE — G2211 COMPLEX E/M VISIT ADD ON: HCPCS | Performed by: INTERNAL MEDICINE

## 2024-12-11 PROCEDURE — 1123F ACP DISCUSS/DSCN MKR DOCD: CPT | Performed by: INTERNAL MEDICINE

## 2024-12-11 PROCEDURE — 3078F DIAST BP <80 MM HG: CPT | Performed by: INTERNAL MEDICINE

## 2024-12-11 PROCEDURE — 1159F MED LIST DOCD IN RCRD: CPT | Performed by: INTERNAL MEDICINE

## 2024-12-11 ASSESSMENT — ENCOUNTER SYMPTOMS
CONSTIPATION: 0
SHORTNESS OF BREATH: 1

## 2024-12-11 NOTE — PROGRESS NOTES
Cameron Mcgraw MD, FACE  [facility]  [address] [place] [address], [address]  [place] [address]  Phone [phone]  Facsimile [phone]          Sin Narayan is a 69 y.o. male seen 12/11/2024 at the request of Dr. Chavira for the evaluation of gynecomastia        ASSESSMENT AND PLAN:    1. Gynecomastia  He has a history of gynecomastia since 2022.  He developed left breast swelling and tenderness in approximately February 2024.  His most recent mammogram 11/2024 revealed left greater than right gynecomastia without worrisome masses.  He states that the left breast swelling and tenderness have improved significantly, and he does not find the symptoms to be particularly bothersome at this time.  He reports a history of recurrent deep venous thrombosis of the right lower extremity, and I would therefore not recommend tamoxifen.  He does report a history of low testosterone, so his gynecomastia could be due to hypogonadism.  Since testosterone replacement therapy can also increase the risk of blood clots, I would not feel comfortable treating him with testosterone replacement either.  Fortunately, his symptoms are mild and significantly improved.  I therefore do not think any treatment is needed at this time.  Gynecomastia reduction surgery would be an option in the future if needed.  I will perform a limited hormonal evaluation as below.    - Testosterone Free MS/Dialysis; Future  - Follicle Stimulating Hormone; Future  - Luteinizing Hormone; Future  - Prolactin; Future  - Estradiol; Future  - TSH with Reflex; Future    2. Recurrent acute deep vein thrombosis (DVT) of right lower extremity (HCC)  See above discussion.    3. Primary hyperparathyroidism (HCC)  He has had an elevated serum calcium level since at least 2022 per my review of his labs on the computer.  In the setting of an elevated parathyroid hormone level, he has primary hyperparathyroidism.  He also likely has

## 2024-12-16 ENCOUNTER — HOSPITAL ENCOUNTER (OUTPATIENT)
Dept: CT IMAGING | Age: 69
Discharge: HOME | End: 2024-12-19
Attending: INTERNAL MEDICINE
Payer: MEDICARE

## 2024-12-16 DIAGNOSIS — R91.1 PULMONARY NODULE: ICD-10-CM

## 2024-12-16 PROCEDURE — 71250 CT THORAX DX C-: CPT

## 2024-12-25 LAB
TESTOST FREE MFR SERPL: 1 %
TESTOST FREE SERPL-MCNC: 44 PG/ML
TESTOST SERPL-MCNC: 435 NG/DL

## 2025-01-06 ENCOUNTER — OFFICE VISIT (OUTPATIENT)
Dept: PULMONOLOGY | Age: 70
End: 2025-01-06
Payer: MEDICARE

## 2025-01-06 VITALS
OXYGEN SATURATION: 97 % | TEMPERATURE: 97.6 F | RESPIRATION RATE: 18 BRPM | WEIGHT: 148 LBS | BODY MASS INDEX: 22.43 KG/M2 | HEIGHT: 68 IN | HEART RATE: 57 BPM | SYSTOLIC BLOOD PRESSURE: 138 MMHG | DIASTOLIC BLOOD PRESSURE: 80 MMHG

## 2025-01-06 DIAGNOSIS — R91.1 PULMONARY NODULE: Primary | ICD-10-CM

## 2025-01-06 DIAGNOSIS — F17.210 NICOTINE DEPENDENCE, CIGARETTES, UNCOMPLICATED: ICD-10-CM

## 2025-01-06 DIAGNOSIS — J44.9 CHRONIC OBSTRUCTIVE PULMONARY DISEASE, UNSPECIFIED COPD TYPE (HCC): ICD-10-CM

## 2025-01-06 PROCEDURE — 4004F PT TOBACCO SCREEN RCVD TLK: CPT | Performed by: INTERNAL MEDICINE

## 2025-01-06 PROCEDURE — 1123F ACP DISCUSS/DSCN MKR DOCD: CPT | Performed by: INTERNAL MEDICINE

## 2025-01-06 PROCEDURE — 1159F MED LIST DOCD IN RCRD: CPT | Performed by: INTERNAL MEDICINE

## 2025-01-06 PROCEDURE — 3023F SPIROM DOC REV: CPT | Performed by: INTERNAL MEDICINE

## 2025-01-06 PROCEDURE — 3079F DIAST BP 80-89 MM HG: CPT | Performed by: INTERNAL MEDICINE

## 2025-01-06 PROCEDURE — G8427 DOCREV CUR MEDS BY ELIG CLIN: HCPCS | Performed by: INTERNAL MEDICINE

## 2025-01-06 PROCEDURE — 99214 OFFICE O/P EST MOD 30 MIN: CPT | Performed by: INTERNAL MEDICINE

## 2025-01-06 PROCEDURE — M1299 PR FLU IMMUNIZE ORDER/ADMIN: HCPCS | Performed by: INTERNAL MEDICINE

## 2025-01-06 PROCEDURE — G8420 CALC BMI NORM PARAMETERS: HCPCS | Performed by: INTERNAL MEDICINE

## 2025-01-06 PROCEDURE — 3017F COLORECTAL CA SCREEN DOC REV: CPT | Performed by: INTERNAL MEDICINE

## 2025-01-06 PROCEDURE — 3075F SYST BP GE 130 - 139MM HG: CPT | Performed by: INTERNAL MEDICINE

## 2025-01-06 NOTE — PROGRESS NOTES
[facility]: FOLLOW-UP Patient Office Visit Note  [address]  [place], [place] [address] [phone]    Patient Name:  Sin Narayan  YOB: 1955            Date of Service:  1/6/2025    Chief Complaint   Patient presents with    COPD    Pulmonary Nodule       History of Present Illness:  This is a 69-year-old white male with a history of tobacco use, hypertension, diabetes, previous stroke, sleep apnea, kidney disease who is here because of abnormal chest CT found on lung screening study. CT scan was done on 20 September and on this there were 2 hyperdense masses noted in the left kidney. In the lung a 13 mm groundglass nodule was noted in the right lower lobe and an additional 12 mm nodule was noted in the posterior right lower lobe.  A repeat chest CT was done in December to follow-up on the September CAT scan.  Nodules were noted to be unchanged and follow-up is recommended in 6 months.  Images are reviewed.    Patient currently denies any worsening of dyspnea in fact feels like he is some better since he has been using Trelegy.  He admits that initially was not using it daily but his sister urged him to use it daily and since then he has improved.  He does admit to a cough on a daily basis particularly in the mornings and continues to smoke.  He says he has cut his smoking down from a pack a day to a half a pack a day and wants to quit.    Past Medical History:   Diagnosis Date    Back disorder     H/O blood clots     Hypercholesterolemia     Hypertension        Patient Active Problem List   Diagnosis    History of CVA (cerebrovascular accident) without residual deficits    Recurrent acute deep vein thrombosis (DVT) of lower extremity (HCC)    Essential hypertension    Mixed hyperlipidemia    Hyperuricemia    Controlled type 2 diabetes mellitus with stage 4 chronic kidney disease, without long-term current use of insulin (HCC)    Breast pain, left    Left

## 2025-01-21 DIAGNOSIS — F17.218 CIGARETTE NICOTINE DEPENDENCE WITH OTHER NICOTINE-INDUCED DISORDER: ICD-10-CM

## 2025-01-21 RX ORDER — BUPROPION HYDROCHLORIDE 150 MG/1
150 TABLET ORAL EVERY MORNING
Qty: 90 TABLET | Refills: 1 | OUTPATIENT
Start: 2025-01-21

## 2025-02-06 ENCOUNTER — TELEPHONE (OUTPATIENT)
Dept: FAMILY MEDICINE CLINIC | Facility: CLINIC | Age: 70
End: 2025-02-06

## 2025-02-06 NOTE — TELEPHONE ENCOUNTER
Pharmacy  [id number] - [place], [place] - [address] [facility] refill  Trelegy  inhaler

## 2025-05-03 DIAGNOSIS — I10 ESSENTIAL HYPERTENSION: ICD-10-CM

## 2025-05-05 RX ORDER — AMLODIPINE BESYLATE 2.5 MG/1
2.5 TABLET ORAL DAILY
Qty: 90 TABLET | Refills: 1 | OUTPATIENT
Start: 2025-05-05

## 2025-05-07 ENCOUNTER — TELEPHONE (OUTPATIENT)
Dept: FAMILY MEDICINE CLINIC | Facility: CLINIC | Age: 70
End: 2025-05-07

## 2025-05-07 NOTE — TELEPHONE ENCOUNTER
Sister Kalpana, was just wanting to contact Dr. Gonzales to let her know patient has been in [facility] for 36 Days for heart/ blood clot issues. He is to be discharged to [facility] on Saturday.     She wasn't aware if anyone had let her know, so just wanted to call.

## 2025-05-12 ENCOUNTER — TELEPHONE (OUTPATIENT)
Dept: PULMONOLOGY | Age: 70
End: 2025-05-12

## 2025-05-12 NOTE — TELEPHONE ENCOUNTER
Called patient and left VM letting him know it was time to schedule his next appointment with Dr. Wolff and to call the office when he is ready to schedule. Sending letter/mychart message      Return in about 6 months (around 7/6/2025).  Mariella/erna

## 2025-05-28 ENCOUNTER — OFFICE VISIT (OUTPATIENT)
Dept: FAMILY MEDICINE CLINIC | Facility: CLINIC | Age: 70
End: 2025-05-28
Payer: MEDICARE

## 2025-05-28 VITALS
OXYGEN SATURATION: 95 % | HEART RATE: 54 BPM | BODY MASS INDEX: 19.1 KG/M2 | HEIGHT: 68 IN | SYSTOLIC BLOOD PRESSURE: 90 MMHG | WEIGHT: 126 LBS | DIASTOLIC BLOOD PRESSURE: 72 MMHG | TEMPERATURE: 98.1 F

## 2025-05-28 DIAGNOSIS — I82.409 RECURRENT ACUTE DEEP VEIN THROMBOSIS (DVT) OF LOWER EXTREMITY, UNSPECIFIED LATERALITY (HCC): ICD-10-CM

## 2025-05-28 DIAGNOSIS — D68.51 FACTOR V LEIDEN: ICD-10-CM

## 2025-05-28 DIAGNOSIS — N18.4 STAGE 4 CHRONIC KIDNEY DISEASE (HCC): ICD-10-CM

## 2025-05-28 DIAGNOSIS — Z71.89 ACP (ADVANCE CARE PLANNING): ICD-10-CM

## 2025-05-28 DIAGNOSIS — I25.118 CORONARY ARTERY DISEASE OF NATIVE ARTERY OF NATIVE HEART WITH STABLE ANGINA PECTORIS: ICD-10-CM

## 2025-05-28 DIAGNOSIS — Z86.74 HISTORY OF CARDIAC ARREST: Primary | ICD-10-CM

## 2025-05-28 DIAGNOSIS — N18.4 CONTROLLED TYPE 2 DIABETES MELLITUS WITH STAGE 4 CHRONIC KIDNEY DISEASE, WITHOUT LONG-TERM CURRENT USE OF INSULIN (HCC): Primary | ICD-10-CM

## 2025-05-28 DIAGNOSIS — N18.4 CONTROLLED TYPE 2 DIABETES MELLITUS WITH STAGE 4 CHRONIC KIDNEY DISEASE, WITHOUT LONG-TERM CURRENT USE OF INSULIN (HCC): ICD-10-CM

## 2025-05-28 DIAGNOSIS — E11.22 CONTROLLED TYPE 2 DIABETES MELLITUS WITH STAGE 4 CHRONIC KIDNEY DISEASE, WITHOUT LONG-TERM CURRENT USE OF INSULIN (HCC): Primary | ICD-10-CM

## 2025-05-28 DIAGNOSIS — Z86.73 HISTORY OF CVA (CEREBROVASCULAR ACCIDENT) WITHOUT RESIDUAL DEFICITS: ICD-10-CM

## 2025-05-28 DIAGNOSIS — F17.210 CIGARETTE NICOTINE DEPENDENCE WITHOUT COMPLICATION: ICD-10-CM

## 2025-05-28 DIAGNOSIS — I10 ESSENTIAL HYPERTENSION: ICD-10-CM

## 2025-05-28 DIAGNOSIS — E11.22 CONTROLLED TYPE 2 DIABETES MELLITUS WITH STAGE 4 CHRONIC KIDNEY DISEASE, WITHOUT LONG-TERM CURRENT USE OF INSULIN (HCC): ICD-10-CM

## 2025-05-28 DIAGNOSIS — E78.2 MIXED HYPERLIPIDEMIA: ICD-10-CM

## 2025-05-28 DIAGNOSIS — L89.102 PRESSURE INJURY OF BACK, STAGE 2 (HCC): ICD-10-CM

## 2025-05-28 LAB
ALBUMIN SERPL-MCNC: 2.6 G/DL (ref 3.2–4.6)
ALBUMIN/GLOB SERPL: 0.5 (ref 1–1.9)
ALP SERPL-CCNC: 155 U/L (ref 40–129)
ALT SERPL-CCNC: 22 U/L (ref 8–55)
ANION GAP SERPL CALC-SCNC: 15 MMOL/L (ref 7–16)
AST SERPL-CCNC: 35 U/L (ref 15–37)
BILIRUB SERPL-MCNC: 0.3 MG/DL (ref 0–1.2)
BUN SERPL-MCNC: 70 MG/DL (ref 8–23)
CALCIUM SERPL-MCNC: 10.1 MG/DL (ref 8.8–10.2)
CHLORIDE SERPL-SCNC: 98 MMOL/L (ref 98–107)
CO2 SERPL-SCNC: 26 MMOL/L (ref 20–29)
CREAT SERPL-MCNC: 3.08 MG/DL (ref 0.8–1.3)
GLOBULIN SER CALC-MCNC: 5.7 G/DL (ref 2.3–3.5)
GLUCOSE SERPL-MCNC: 98 MG/DL (ref 70–99)
POTASSIUM SERPL-SCNC: 3.8 MMOL/L (ref 3.5–5.1)
PROT SERPL-MCNC: 8.3 G/DL (ref 6.3–8.2)
SODIUM SERPL-SCNC: 139 MMOL/L (ref 136–145)

## 2025-05-28 PROCEDURE — 99215 OFFICE O/P EST HI 40 MIN: CPT | Performed by: FAMILY MEDICINE

## 2025-05-28 PROCEDURE — 3046F HEMOGLOBIN A1C LEVEL >9.0%: CPT | Performed by: FAMILY MEDICINE

## 2025-05-28 PROCEDURE — 3078F DIAST BP <80 MM HG: CPT | Performed by: FAMILY MEDICINE

## 2025-05-28 PROCEDURE — 2022F DILAT RTA XM EVC RTNOPTHY: CPT | Performed by: FAMILY MEDICINE

## 2025-05-28 PROCEDURE — 1123F ACP DISCUSS/DSCN MKR DOCD: CPT | Performed by: FAMILY MEDICINE

## 2025-05-28 PROCEDURE — 1159F MED LIST DOCD IN RCRD: CPT | Performed by: FAMILY MEDICINE

## 2025-05-28 PROCEDURE — 3074F SYST BP LT 130 MM HG: CPT | Performed by: FAMILY MEDICINE

## 2025-05-28 PROCEDURE — G8427 DOCREV CUR MEDS BY ELIG CLIN: HCPCS | Performed by: FAMILY MEDICINE

## 2025-05-28 PROCEDURE — G8420 CALC BMI NORM PARAMETERS: HCPCS | Performed by: FAMILY MEDICINE

## 2025-05-28 PROCEDURE — 1126F AMNT PAIN NOTED NONE PRSNT: CPT | Performed by: FAMILY MEDICINE

## 2025-05-28 PROCEDURE — 1036F TOBACCO NON-USER: CPT | Performed by: FAMILY MEDICINE

## 2025-05-28 PROCEDURE — 3017F COLORECTAL CA SCREEN DOC REV: CPT | Performed by: FAMILY MEDICINE

## 2025-05-28 RX ORDER — INSULIN LISPRO 100 [IU]/ML
2 INJECTION, SOLUTION INTRAVENOUS; SUBCUTANEOUS
COMMUNITY
Start: 2025-05-09 | End: 2025-06-08

## 2025-05-28 RX ORDER — GLUCOSAMINE HCL/CHONDROITIN SU 500-400 MG
CAPSULE ORAL
Qty: 300 STRIP | Refills: 3 | Status: SHIPPED | OUTPATIENT
Start: 2025-05-28

## 2025-05-28 RX ORDER — METOPROLOL SUCCINATE 25 MG/1
12.5 TABLET, EXTENDED RELEASE ORAL DAILY
Qty: 45 TABLET | Refills: 1 | Status: SHIPPED | OUTPATIENT
Start: 2025-05-28

## 2025-05-28 RX ORDER — HYDROXYZINE HYDROCHLORIDE 25 MG/1
25 TABLET, FILM COATED ORAL EVERY 8 HOURS PRN
Qty: 90 TABLET | Refills: 1 | Status: SHIPPED | OUTPATIENT
Start: 2025-05-28 | End: 2025-08-26

## 2025-05-28 RX ORDER — MIDODRINE HYDROCHLORIDE 5 MG/1
5 TABLET ORAL 3 TIMES DAILY
COMMUNITY
End: 2025-05-29

## 2025-05-28 RX ORDER — TRAZODONE HYDROCHLORIDE 50 MG/1
50 TABLET ORAL NIGHTLY
COMMUNITY
End: 2025-05-28

## 2025-05-28 RX ORDER — INSULIN LISPRO 100 [IU]/ML
2 INJECTION, SOLUTION INTRAVENOUS; SUBCUTANEOUS 3 TIMES DAILY
Qty: 15 ML | Refills: 3 | Status: SHIPPED | OUTPATIENT
Start: 2025-05-28 | End: 2025-05-28

## 2025-05-28 RX ORDER — POTASSIUM CHLORIDE 1500 MG/1
40 TABLET, EXTENDED RELEASE ORAL DAILY
Qty: 180 TABLET | Refills: 1 | Status: SHIPPED | OUTPATIENT
Start: 2025-05-28

## 2025-05-28 RX ORDER — HYDROXYZINE HYDROCHLORIDE 25 MG/1
25 TABLET, FILM COATED ORAL EVERY 12 HOURS PRN
COMMUNITY
End: 2025-05-28

## 2025-05-28 RX ORDER — INSULIN ASPART 100 [IU]/ML
2 INJECTION, SOLUTION INTRAVENOUS; SUBCUTANEOUS
Qty: 5 ADJUSTABLE DOSE PRE-FILLED PEN SYRINGE | Refills: 3 | Status: SHIPPED | OUTPATIENT
Start: 2025-05-28

## 2025-05-28 RX ORDER — BLOOD-GLUCOSE METER
1 KIT MISCELLANEOUS DAILY
Qty: 1 KIT | Refills: 0 | Status: SHIPPED | OUTPATIENT
Start: 2025-05-28

## 2025-05-28 RX ORDER — TORSEMIDE 100 MG/1
100 TABLET ORAL DAILY
Qty: 30 TABLET | Refills: 5 | Status: SHIPPED | OUTPATIENT
Start: 2025-05-28

## 2025-05-28 RX ORDER — PANTOPRAZOLE SODIUM 40 MG/1
40 TABLET, DELAYED RELEASE ORAL
Qty: 90 TABLET | Refills: 1 | Status: SHIPPED | OUTPATIENT
Start: 2025-05-28

## 2025-05-28 RX ORDER — HYDROCODONE BITARTRATE AND ACETAMINOPHEN 10; 325 MG/1; MG/1
1 TABLET ORAL EVERY 6 HOURS PRN
COMMUNITY

## 2025-05-28 RX ORDER — AVOBENZONE, HOMOSALATE, OCTISALATE, OCTOCRYLENE 30; 40; 45; 26 MG/ML; MG/ML; MG/ML; MG/ML
1 CREAM TOPICAL DAILY
Qty: 100 EACH | Refills: 0 | Status: SHIPPED | OUTPATIENT
Start: 2025-05-28

## 2025-05-28 RX ORDER — INSULIN GLARGINE 100 [IU]/ML
8 INJECTION, SOLUTION SUBCUTANEOUS NIGHTLY
Qty: 5 ADJUSTABLE DOSE PRE-FILLED PEN SYRINGE | Refills: 3 | Status: SHIPPED | OUTPATIENT
Start: 2025-05-28

## 2025-05-28 RX ORDER — CLOPIDOGREL BISULFATE 75 MG/1
75 TABLET ORAL DAILY
Qty: 90 TABLET | Refills: 1 | Status: SHIPPED | OUTPATIENT
Start: 2025-05-28

## 2025-05-28 RX ORDER — METHOCARBAMOL 750 MG/1
750 TABLET, FILM COATED ORAL 3 TIMES DAILY
COMMUNITY
Start: 2025-05-09 | End: 2025-06-08

## 2025-05-28 RX ORDER — TORSEMIDE 100 MG/1
100 TABLET ORAL DAILY
COMMUNITY
Start: 2025-05-09 | End: 2025-05-28

## 2025-05-28 RX ORDER — ATORVASTATIN CALCIUM 80 MG/1
80 TABLET, FILM COATED ORAL DAILY
Qty: 90 TABLET | Refills: 3 | Status: SHIPPED | OUTPATIENT
Start: 2025-05-28

## 2025-05-28 RX ORDER — TRAZODONE HYDROCHLORIDE 50 MG/1
50 TABLET ORAL NIGHTLY
Qty: 90 TABLET | Refills: 1 | Status: SHIPPED | OUTPATIENT
Start: 2025-05-28

## 2025-05-28 SDOH — ECONOMIC STABILITY: FOOD INSECURITY: WITHIN THE PAST 12 MONTHS, YOU WORRIED THAT YOUR FOOD WOULD RUN OUT BEFORE YOU GOT MONEY TO BUY MORE.: NEVER TRUE

## 2025-05-28 SDOH — ECONOMIC STABILITY: FOOD INSECURITY: WITHIN THE PAST 12 MONTHS, THE FOOD YOU BOUGHT JUST DIDN'T LAST AND YOU DIDN'T HAVE MONEY TO GET MORE.: NEVER TRUE

## 2025-05-28 ASSESSMENT — ENCOUNTER SYMPTOMS
BACK PAIN: 0
CHEST TIGHTNESS: 0
COUGH: 0
ABDOMINAL PAIN: 0
CONSTIPATION: 0
SHORTNESS OF BREATH: 0
DIARRHEA: 0
WHEEZING: 0

## 2025-05-28 NOTE — PATIENT INSTRUCTIONS
Fingersticks in AM - Fasting  110 or less    Fingerstick 2 hours post meal 140 - 150     Lantus once a day -   Short acting - premeal - as needed for sugars over 150.      Start jardiance 10 mg daily  - Can increase your urination.     Restart eliquis 5 mg twice a day   Restart plavix 75 mg daily .     Start Metoprolol 12.5 mg daily for your heart and blood pressure. Hold for Systolic blood pressure less than 100 (top number )      Methocarbamol - take as needed for back pain    Trazodone 50 mg at bedtime for sleep as needed.   Hydroxyzine 25 mg up to 3 time a day for anxiety  - you can take for sleep too. Will make you drowsy. ;Caution taking with trazodone. Do not take together.    Advance Care Planning     Advance Care Planning opens a door to talk about and write down your wishes before a sudden accident or illness.  Make your goals, values, and preferences known.     This puts you in the ’s seat and helps others know what matters most to you so they won’t have to guess.      Where can you learn more?    Go to [email or web address]   to learn how to:    Name someone you trust to make healthcare decisions for you, only if you can’t. (Healthcare Power of )    Document your wishes for care if you were seriously ill and not expected to recover or are approaching end of life. (Advance Directive or Living Will)    The same page can be found using the QR code below.

## 2025-05-28 NOTE — PROGRESS NOTES
pericarditis  Other chest pain  -Pericarditis diagnosed 4/13   -Initially treated with high-dose aspirin which was discontinued by EP given his hematoma at the ICD site.    Acute kidney injury superimposed on CKD stage IV  Metabolic acidosis  -Baseline creatinine ~ 2.5. Creatinine iwent up to 3.72 during hospitalization   -Initial rise thought to have been 2/2 contrast related nephropathy vs ATN as a result of cardiac arrest / CHF.   -Cr did improve back to patient's baseline and then went up to 3.72 again. Thought to be in the setting of diuresis.   -Creatinine now stable at 3.3    Hypokalemia   -Resolved.   -Will discharge with po K.   -Recommend repeat potassium lab on 5/12 at facility     Hypotension  -Intermittently hypotensive this admission.   -Midodrine added by heart failure while diuresing.     PAD (peripheral artery disease)  -Continue atorvastatin 80 mg daily, aspirin, Plavix    Type 2 diabetes mellitus with hyperglycemia, without long-term current use of insulin (HCC)  -A1c is 6.4%  -Blood sugars are overall controlled.   -Continue Lantus 8 units nightly, 2 units prandial insulin    History of CVA (cerebrovascular accident)  -Continue atorvastatin 80 mg daily. Aspirin discontinued    HLD (hyperlipidemia)  -Continue atorvastatin 80 mg daily    Acute hypoxic respiratory failure (HCC)  -Patient with drop in O2 saturations at night.   -Possibly some underlying TERESA  -Supplemental O2 qhs     Transaminitis  -Resolved.   -Likely secondary to shock liver from cardiac arrest.   -RUQ US with patent portal vein with normal directional flow, evidence of acute liver injury.   -Lipitor resumed without issues.    Tobacco abuse  -Counseled earlier in admission.    Coloproctitis  -Noted on CT A&P. Pt should see GI outpatient for colonoscopy to rule out underlying lesion. Do not suspect infectious. Plan for referral to GI outpatient for colonoscopy.    Normocytic anemia  -Hgb stable at 8.7 on last check

## 2025-05-29 ENCOUNTER — TELEPHONE (OUTPATIENT)
Dept: FAMILY MEDICINE CLINIC | Facility: CLINIC | Age: 70
End: 2025-05-29

## 2025-05-29 ENCOUNTER — INITIAL CONSULT (OUTPATIENT)
Age: 70
End: 2025-05-29
Payer: MEDICARE

## 2025-05-29 ENCOUNTER — RESULTS FOLLOW-UP (OUTPATIENT)
Dept: FAMILY MEDICINE CLINIC | Facility: CLINIC | Age: 70
End: 2025-05-29

## 2025-05-29 VITALS
WEIGHT: 127 LBS | DIASTOLIC BLOOD PRESSURE: 60 MMHG | SYSTOLIC BLOOD PRESSURE: 100 MMHG | BODY MASS INDEX: 18.18 KG/M2 | HEIGHT: 70 IN | HEART RATE: 108 BPM

## 2025-05-29 DIAGNOSIS — I50.23 ACUTE ON CHRONIC SYSTOLIC HEART FAILURE (HCC): ICD-10-CM

## 2025-05-29 DIAGNOSIS — E78.2 MIXED HYPERLIPIDEMIA: ICD-10-CM

## 2025-05-29 DIAGNOSIS — D50.0 IRON DEFICIENCY ANEMIA DUE TO CHRONIC BLOOD LOSS: ICD-10-CM

## 2025-05-29 DIAGNOSIS — N18.4 STAGE 4 CHRONIC KIDNEY DISEASE (HCC): ICD-10-CM

## 2025-05-29 DIAGNOSIS — I25.118 CORONARY ARTERY DISEASE OF NATIVE ARTERY OF NATIVE HEART WITH STABLE ANGINA PECTORIS: ICD-10-CM

## 2025-05-29 DIAGNOSIS — I10 ESSENTIAL HYPERTENSION: ICD-10-CM

## 2025-05-29 DIAGNOSIS — Z76.89 ENCOUNTER TO ESTABLISH CARE: Primary | ICD-10-CM

## 2025-05-29 DIAGNOSIS — I48.0 PAROXYSMAL ATRIAL FIBRILLATION (HCC): ICD-10-CM

## 2025-05-29 DIAGNOSIS — D68.51 FACTOR V LEIDEN: ICD-10-CM

## 2025-05-29 PROCEDURE — G8419 CALC BMI OUT NRM PARAM NOF/U: HCPCS | Performed by: INTERNAL MEDICINE

## 2025-05-29 PROCEDURE — 3078F DIAST BP <80 MM HG: CPT | Performed by: INTERNAL MEDICINE

## 2025-05-29 PROCEDURE — 3017F COLORECTAL CA SCREEN DOC REV: CPT | Performed by: INTERNAL MEDICINE

## 2025-05-29 PROCEDURE — 1159F MED LIST DOCD IN RCRD: CPT | Performed by: INTERNAL MEDICINE

## 2025-05-29 PROCEDURE — 93000 ELECTROCARDIOGRAM COMPLETE: CPT | Performed by: INTERNAL MEDICINE

## 2025-05-29 PROCEDURE — 1126F AMNT PAIN NOTED NONE PRSNT: CPT | Performed by: INTERNAL MEDICINE

## 2025-05-29 PROCEDURE — G8427 DOCREV CUR MEDS BY ELIG CLIN: HCPCS | Performed by: INTERNAL MEDICINE

## 2025-05-29 PROCEDURE — 3074F SYST BP LT 130 MM HG: CPT | Performed by: INTERNAL MEDICINE

## 2025-05-29 PROCEDURE — 1123F ACP DISCUSS/DSCN MKR DOCD: CPT | Performed by: INTERNAL MEDICINE

## 2025-05-29 PROCEDURE — 1160F RVW MEDS BY RX/DR IN RCRD: CPT | Performed by: INTERNAL MEDICINE

## 2025-05-29 PROCEDURE — 99215 OFFICE O/P EST HI 40 MIN: CPT | Performed by: INTERNAL MEDICINE

## 2025-05-29 PROCEDURE — 1036F TOBACCO NON-USER: CPT | Performed by: INTERNAL MEDICINE

## 2025-05-29 RX ORDER — INSULIN ASPART 100 [IU]/ML
INJECTION, SOLUTION INTRAVENOUS; SUBCUTANEOUS
Refills: 0 | OUTPATIENT
Start: 2025-05-29

## 2025-05-29 ASSESSMENT — ENCOUNTER SYMPTOMS
SHORTNESS OF BREATH: 1
GASTROINTESTINAL NEGATIVE: 1
BACK PAIN: 0
PHOTOPHOBIA: 0
ALLERGIC/IMMUNOLOGIC NEGATIVE: 1
EYE PAIN: 0
ABDOMINAL PAIN: 0
CHEST TIGHTNESS: 0
EYES NEGATIVE: 1

## 2025-05-29 NOTE — TELEPHONE ENCOUNTER
Home health nurse stated patient refused to take Wellbutrin. She states it was on our AWV yesterday he was to continue on Midodrine & Methocarbamol. He does not have any & was not sent to the pharmacy. Do you want him to continue these? If so needs a RX

## 2025-05-29 NOTE — PROGRESS NOTES
2 [facility], [address] [place] [address]  PHONE: [phone]    Sin Narayan  1955    SUBJECTIVE:   Sin Narayan is a 70 y.o. male seen for initial evaluation of:      Chief Complaint   Patient presents with    Consultation        Cardiac Hx (Reviewed and summarized by me):  Followed by [facility]  1) CAD   3/31/25 - Inferior STEMI Had VT then Vfib arrest in the field prior cath with PCI to RCA (4.0x48 and 4.0 x 12 Xience. Residual 70% LAD disease (Esdras White MD)  2) ICM   Echo 4/13/25 - LVEF 45% apical HK  3) Lipids - on atorvastatin 80 mg daily   9/9/24 - HDL 73, LDL 91, Trig 84, Ratio 2.5  4/30/25 - HDL 41, LDL 20, Trig 63, Ratio 1.8  4) ICD placed 4/8/25 (medtronic)  5) Course complicated by Afib  6) Factor V leiden - on eliquis hx of DVTs  7) Acute post infarction pericarditis - high dose asa  8) CKD 3/28/25 - CR 3.08 GFR 21 (CKD stage IV)  9) Tobacco abuse - quit post arrest    HPI:  Very complex 70-year-old male presents for initial evaluation.  In late March he had a syncopal episode at home.  He lives with his 90-year-old mother who called EMS.  When they got there he was experiencing chest pain and initial EKG showed him to be in ventricular tachycardia.  This devolved into ventricular fibrillation and he required several defibrillations and in the field resuscitation.  He was intubated and brought to [facility] where an initial EKG indicated an inferior MI.  He was taken emergently to the Cath Lab where he received 2 stents to the right coronary.  He was post arrest care was complicated by hypotension.  He had a recurrence of VT started on amiodarone however he developed prolonged QT and this was stopped.  For a time he called lidocaine.  He ended up with a defibrillator (Medtronic).  After approximately 2 weeks he had improvement in his LV function.  His heart failure care was complicated by hypotension.  He has underlying factor V Leiden deficiency and is on

## 2025-05-30 ENCOUNTER — TELEPHONE (OUTPATIENT)
Age: 70
End: 2025-05-30

## 2025-05-30 NOTE — TELEPHONE ENCOUNTER
I left the patient a message to call me.  Dr. Willis wanted to make sure he knew to have labs drawn.

## 2025-06-04 ENCOUNTER — TELEPHONE (OUTPATIENT)
Age: 70
End: 2025-06-04

## 2025-06-04 NOTE — TELEPHONE ENCOUNTER
Elie Willis MD Robinson, Donna M2 hours ago (12:05 PM)       Arrange an BMP and NTproBNP and we can make decisions based on those numbers.    Elie Willis MD

## 2025-06-04 NOTE — TELEPHONE ENCOUNTER
Patient was in today for Echo.  Patient states he is on a fluid pill , but has been told not to drink to much water because of his heart.  He says he is dehydrated and wanting to know what to do.  BP today was 90/60  NP ask that I also send this to Dr. Willis

## 2025-06-04 NOTE — TELEPHONE ENCOUNTER
I left the patient a message to return my call.  I left the patient a [employer] message one day as well.

## 2025-06-05 NOTE — TELEPHONE ENCOUNTER
I called pt and informed of MD response and he said he does not drive.I asked him how he got to his appt.yesterday and he  hung up on me or we got disconnected.I called back no answer LV to call me back at [phone].

## 2025-06-06 ENCOUNTER — HOSPITAL ENCOUNTER (OUTPATIENT)
Dept: CARDIAC CATH/INVASIVE PROCEDURES | Age: 70
Discharge: HOME | End: 2025-06-06
Attending: INTERNAL MEDICINE | Admitting: INTERNAL MEDICINE
Payer: MEDICARE

## 2025-06-06 DIAGNOSIS — I48.0 PAROXYSMAL ATRIAL FIBRILLATION (HCC): ICD-10-CM

## 2025-06-06 LAB
ANION GAP SERPL CALC-SCNC: 16 MMOL/L (ref 7–16)
BUN SERPL-MCNC: 64 MG/DL (ref 8–23)
CALCIUM SERPL-MCNC: 9.7 MG/DL (ref 8.8–10.2)
CHLORIDE SERPL-SCNC: 96 MMOL/L (ref 98–107)
CO2 SERPL-SCNC: 27 MMOL/L (ref 20–29)
CREAT SERPL-MCNC: 3.01 MG/DL (ref 0.8–1.3)
EKG ATRIAL RATE: 40 BPM
EKG DIAGNOSIS: NORMAL
EKG P-R INTERVAL: 152 MS
EKG Q-T INTERVAL: 436 MS
EKG QRS DURATION: 122 MS
EKG QTC CALCULATION (BAZETT): 502 MS
EKG R AXIS: -78 DEGREES
EKG T AXIS: 99 DEGREES
EKG VENTRICULAR RATE: 80 BPM
ERYTHROCYTE [DISTWIDTH] IN BLOOD BY AUTOMATED COUNT: 17.6 % (ref 11.9–14.6)
GLUCOSE SERPL-MCNC: 110 MG/DL (ref 70–99)
HCT VFR BLD AUTO: 38.5 % (ref 41.1–50.3)
HGB BLD-MCNC: 12 G/DL (ref 13.6–17.2)
IRON SATN MFR SERPL: 11 % (ref 20–50)
IRON SERPL-MCNC: 46 UG/DL (ref 35–100)
MAGNESIUM SERPL-MCNC: 2.3 MG/DL (ref 1.8–2.4)
MCH RBC QN AUTO: 26.2 PG (ref 26.1–32.9)
MCHC RBC AUTO-ENTMCNC: 31.2 G/DL (ref 31.4–35)
MCV RBC AUTO: 84.1 FL (ref 82–102)
NRBC # BLD: 0 K/UL (ref 0–0.2)
NT PRO BNP: 3930 PG/ML (ref 0–125)
PLATELET # BLD AUTO: 318 K/UL (ref 150–450)
PMV BLD AUTO: 10.8 FL (ref 9.4–12.3)
POTASSIUM SERPL-SCNC: ABNORMAL MMOL/L (ref 3.5–5.1)
RBC # BLD AUTO: 4.58 M/UL (ref 4.23–5.6)
SODIUM SERPL-SCNC: 139 MMOL/L (ref 136–145)
TIBC SERPL-MCNC: 406 UG/DL (ref 240–450)
UIBC SERPL-MCNC: 360 UG/DL (ref 112–347)
WBC # BLD AUTO: 8.9 K/UL (ref 4.3–11.1)

## 2025-06-06 PROCEDURE — 85027 COMPLETE CBC AUTOMATED: CPT

## 2025-06-06 PROCEDURE — 83735 ASSAY OF MAGNESIUM: CPT

## 2025-06-06 PROCEDURE — 83880 ASSAY OF NATRIURETIC PEPTIDE: CPT

## 2025-06-06 PROCEDURE — 93010 ELECTROCARDIOGRAM REPORT: CPT | Performed by: INTERNAL MEDICINE

## 2025-06-06 PROCEDURE — 83540 ASSAY OF IRON: CPT

## 2025-06-06 PROCEDURE — 93005 ELECTROCARDIOGRAM TRACING: CPT | Performed by: INTERNAL MEDICINE

## 2025-06-06 PROCEDURE — 80048 BASIC METABOLIC PNL TOTAL CA: CPT

## 2025-06-06 PROCEDURE — 83550 IRON BINDING TEST: CPT

## 2025-06-06 NOTE — TELEPHONE ENCOUNTER
Elie Willis MD  You2 minutes ago (3:26 PM)       Start oral iron supplementation. Fe gluconate 325 mg daily.    Elie Willis MD

## 2025-06-06 NOTE — TELEPHONE ENCOUNTER
,  Did you see the elevated BNP?    Elie Willis MD  You57 minutes ago (3:34 PM)       It is because of his known LV dysfunction and severe kidney disease.    Elie Willis MD

## 2025-06-06 NOTE — TELEPHONE ENCOUNTER
,  His labs are available for review.   [facility] Hospitalist Internal Medicine Progress Note            Assessment/Plan:    Active Problems:    * No active hospital problems. *          #Significant hematuria, blood loss anemia  Patient was suspected to have a bladder perforation, status post OR on January 2, no bladder defect was noted  Urology following  CBI was stopped January 3  Continue Flomax  Hemoglobin is stable  CT scan showed extraperitoneal hematoma without any bladder injury upon exploration in the OR  Sp FEIBA, and vit K  1/2 and January 4, PRBCs given  hypercoaguable studies sent: Act Protein C, AT3, Protein S, PT genotype - However on heparin gtt so not as useful  hgb 7.3 stable today  Hematuria has resolved  Check iron studies: GILES.  Start ferrous sulfate    Thrombocytopenia  Likely consumptive  Check TSH B12 folate: All okay       #Hypotension and shock in the setting of acute retroperitoneal hemorrhage  Required Levophed  Now resolved     #VIVI on stage 4 CKD and ATN   Required CRRT, nephrology following  Has a femoral dialysis catheter in place  Continue Awan  On hemodialysis Monday Wednesday Friday  Continue Lasix challenge, so far no evidence of renal recovery  Patient may need permacath: Discussed with nephrology      #Atrial fibrillation on warfarin at home  Hold Coumadin  Continue metoprolol  On heparin drip    #GPC positive blood culture likely contaminant  Given zosyn and vanco by ICU   Off antibiotics  Appreciate infectious disease     #Diastolic congestive heart failure  2D echo shows EF 67%  Continue Lasix, torsemide being held     #COPD  Continue bronchodilators     #Acute postop pain  Continue Tylenol     #GERD on PPI     #LE edema  Obtain dopplers     #type 2 DM on ssi      #Gout on allopurinol and colchicine     #Fully vaccinated and boosted against COVID 19     #debility PT OT      DVT ppx heparin  FULL CODE     Will need subacute rehab at discharge      Subjective:      Sitting up  Denies pain  Discussed with RN      Review  of Systems  Negative for all 10 systems except as per subjective    I/O's    Intake/Output Summary (Last 24 hours) at 1/9/2022 1407  Last data filed at 1/9/2022 1000  Gross per 24 hour   Intake 346.2 ml   Output 1730 ml   Net -1383.8 ml         ALLERGIES:  Patient has no known allergies.     Hospital Meds  Current Facility-Administered Medications   Medication Dose Route Frequency Provider Last Rate Last Admin   • heparin (porcine) 25,000 units/250 mL in dextrose 5 % infusion  1-30 Units/kg/hr (Dosing Weight) Intravenous Continuous Collins Leavitt MD 17.4 mL/hr at 01/09/22 0417 14 Units/kg/hr at 01/09/22 0417   • metoPROLOL succinate (TOPROL-XL) ER tablet 25 mg  25 mg Oral Daily Celso Goodman   25 mg at 01/09/22 0932   • colchicine (COLCRYS) tablet 0.6 mg  0.6 mg Oral Daily PRN Dwayne Kwon MD   0.6 mg at 01/08/22 2124   • mupirocin (BACTROBAN) 2 % ointment   Topical BID Celso Goodman   Given at 01/09/22 1153   • traZODone (DESYREL) tablet 50 mg  50 mg Oral Nightly Celso Goodman   50 mg at 01/07/22 2108   • finasteride (PROSCAR) tablet 5 mg  5 mg Oral Daily Collins Alfonso   5 mg at 01/09/22 0932   • sodium chloride 0.9% infusion   Intravenous Continuous PRN Jaylen Wang MD 25 mL/hr at 01/04/22 1429 25 mL at 01/04/22 1429   • polyethylene glycol (MIRALAX) packet 17 g  17 g Oral Daily PRN Celso Goodman       • sodium chloride (PF) 0.9 % injection 10 mL  10 mL Intracatheter PRN Celso Goodman   10 mL at 01/06/22 0841   • lidocaine (LIDOCARE) 4 % patch 1 patch  1 patch Transdermal Daily Celso Goodman   1 patch at 01/08/22 1730   • alteplase (CATHFLO ACTIVASE) injection 2 mg  2 mg Intracatheter PRN Celso Goodman   2 mg at 01/04/22 1024   • sodium zirconium cyclosilicate (LOKELMA) packet 10 g  10 g Oral Once Kate Bentley MD       • belladonna-opium (B&O SUPPRETTE) 16.2-60 MG suppository 1 suppository  1 suppository Rectal 4x Daily PRN Ema Aleman DO       • acetaminophen (TYLENOL) tablet 650 mg  650 mg Oral Q4H PRN  Dina Ortega MD   650 mg at 12/31/21 0415   • ondansetron (ZOFRAN) injection 4 mg  4 mg Intravenous Q12H PRN Dina Ortega MD       • allopurinol (ZYLOPRIM) tablet 300 mg  300 mg Oral Daily Dina Ortega MD   300 mg at 01/09/22 0932   • fluticasone-vilanterol (BREO ELLIPTA) 200-25 MCG/INH inhaler 1 puff  1 puff Inhalation Daily Dina Ortega MD   1 puff at 01/09/22 1152   • docusate sodium-sennosides (SENOKOT S) 50-8.6 MG 1 tablet  1 tablet Oral Nightly Dina Ortega MD   1 tablet at 01/08/22 2115   • famotidine (PEPCID) tablet 20 mg  20 mg Oral Nightly Dina Ortega MD   20 mg at 01/08/22 2115   • fluticasone (FLONASE) 50 MCG/ACT nasal spray 2 spray  2 spray Each Nare Daily Dina Ortega MD   2 spray at 01/09/22 1152   • tamsulosin (FLOMAX) capsule 0.4 mg  0.4 mg Oral Daily Dina Ortega MD   0.4 mg at 01/09/22 0932   • dextrose 50 % injection 25 g  25 g Intravenous PRN Dina Ortega MD       • dextrose 50 % injection 12.5 g  12.5 g Intravenous PRN Dina Ortega MD       • glucagon (GLUCAGEN) injection 1 mg  1 mg Intramuscular PRN Dina Ortega MD       • dextrose (GLUTOSE) 40 % gel 15 g  15 g Oral PRN Dina Ortega MD       • dextrose (GLUTOSE) 40 % gel 30 g  30 g Oral PRN Dina Ortega MD       • insulin lispro (ADMELOG,HumaLOG) - Correction Dose   Subcutaneous TID WC Dina Ortega MD   1 Units at 01/07/22 2108   • albuterol inhaler 2 puff  2 puff Inhalation Q4H Resp PRN Dina Ortega MD            Last Recorded Vitals  Visit Vitals  /56   Pulse 71   Temp 98.2 °F (36.8 °C) (Oral)   Resp 20   Ht 6' 1\" (1.854 m)   Wt (!) 145.8 kg (321 lb 6.9 oz)   SpO2 97%   BMI 42.41 kg/m²         SpO2 Readings from Last 3 Encounters:   01/09/22 97%   12/29/21 100%   12/20/21 97%        General:  Alert and oriented  Eye:  Pupils are equal, round and reactive to light, Normal conjunctiva.    HENT:  Normocephalic.    Neck:  Supple, No carotid bruit, No lymphadenopathy.    Respiratory:  ctab,  respirations are non-labored,  symmetrical expansion  Cardiovascular:  Normal rate, Regular rhythm, No murmur, Good pulses equal in all extremities.    Gastrointestinal:  Soft, Non-tender, Non-distended, Normal bowel sounds.    Lymphatics:  No lymphadenopathy neck, axilla, groin.    Integumentary:  Warm, Intact.    Extremities no edema  no calf tenderness   Neurologic:  Alert, Oriented, Normal motor function, No focal deficits.        Psychiatric:  Cooperative, Appropriate mood & affect.         MSK: strength intact, symmetrical b/l upper and lower extremities. ROM preserved     Labs     Recent Results (from the past 24 hour(s))   GLUCOSE, BEDSIDE - POINT OF CARE    Collection Time: 01/08/22  4:59 PM   Result Value Ref Range    GLUCOSE, BEDSIDE - POINT OF CARE 141 (H) 70 - 99 mg/dL   Partial Thromboplastin Time    Collection Time: 01/08/22  5:56 PM   Result Value Ref Range    PTT 45 (H) 22 - 30 sec   Prothrombin Time    Collection Time: 01/09/22  3:53 AM   Result Value Ref Range    Prothrombin Time 13.5 (H) 9.7 - 11.8 sec    INR 1.3     Partial Thromboplastin Time    Collection Time: 01/09/22  3:53 AM   Result Value Ref Range    PTT 46 (H) 22 - 30 sec   Basic Metabolic Panel    Collection Time: 01/09/22  3:54 AM   Result Value Ref Range    Fasting Status      Sodium 137 135 - 145 mmol/L    Potassium 4.3 3.4 - 5.1 mmol/L    Chloride 103 98 - 107 mmol/L    Carbon Dioxide 27 21 - 32 mmol/L    Anion Gap 11 10 - 20 mmol/L    Glucose 113 (H) 70 - 99 mg/dL    BUN 34 (H) 6 - 20 mg/dL    Creatinine 5.14 (H) 0.67 - 1.17 mg/dL    Glomerular Filtration Rate 11 (L) >=60    BUN/ Creatinine Ratio 7 7 - 25    Calcium 7.4 (L) 8.4 - 10.2 mg/dL   Thyroid Stimulating Hormone Reflex    Collection Time: 01/09/22  3:54 AM   Result Value Ref Range    TSH 2.977 0.350 - 5.000 mcUnits/mL   Vitamin B12 And Folate    Collection Time: 01/09/22  3:54 AM   Result Value Ref Range    Vitamin B12 1,607 (H) 211 - 911 pg/mL    Folate 3.7 (L) >=5.5 ng/mL   Iron And total Iron Binding  Capacity    Collection Time: 01/09/22  3:54 AM   Result Value Ref Range    Iron 41 (L) 65 - 175 mcg/dL    Iron Binding Capacity 154 (L) 250 - 450 mcg/dL    Iron, Percent Saturation 27 15 - 45 %   Ferritin    Collection Time: 01/09/22  3:54 AM   Result Value Ref Range    Ferritin 219 26 - 388 ng/mL   Basic Metabolic Panel    Collection Time: 01/09/22  3:55 AM   Result Value Ref Range    Fasting Status      Sodium 137 135 - 145 mmol/L    Potassium 4.3 3.4 - 5.1 mmol/L    Chloride 103 98 - 107 mmol/L    Carbon Dioxide 28 21 - 32 mmol/L    Anion Gap 10 10 - 20 mmol/L    Glucose 113 (H) 70 - 99 mg/dL    BUN 34 (H) 6 - 20 mg/dL    Creatinine 5.20 (H) 0.67 - 1.17 mg/dL    Glomerular Filtration Rate 11 (L) >=60    BUN/ Creatinine Ratio 7 7 - 25    Calcium 7.4 (L) 8.4 - 10.2 mg/dL   CBC with Automated Differential (performable only)    Collection Time: 01/09/22  3:55 AM   Result Value Ref Range    WBC 12.9 (H) 4.2 - 11.0 K/mcL    RBC 2.48 (L) 4.50 - 5.90 mil/mcL    HGB 7.5 (L) 13.0 - 17.0 g/dL    HCT 23.6 (L) 39.0 - 51.0 %    MCV 95.2 78.0 - 100.0 fl    MCH 30.2 26.0 - 34.0 pg    MCHC 31.8 (L) 32.0 - 36.5 g/dL    RDW-CV 15.8 (H) 11.0 - 15.0 %    RDW-SD 52.1 (H) 39.0 - 50.0 fL     (L) 140 - 450 K/mcL    NRBC 0 <=0 /100 WBC    Neutrophil, Percent 71 %    Lymphocytes, Percent 13 %    Mono, Percent 11 %    Eosinophils, Percent 4 %    Basophils, Percent 0 %    Immature Granulocytes 1 %    Absolute Neutrophils 9.1 (H) 1.8 - 7.7 K/mcL    Absolute Lymphocytes 1.7 1.0 - 4.0 K/mcL    Absolute Monocytes 1.4 (H) 0.3 - 0.9 K/mcL    Absolute Eosinophils  0.5 0.0 - 0.5 K/mcL    Absolute Basophils 0.1 0.0 - 0.3 K/mcL    Absolute Immmature Granulocytes 0.2 0.0 - 0.2 K/mcL   GLUCOSE, BEDSIDE - POINT OF CARE    Collection Time: 01/09/22  7:50 AM   Result Value Ref Range    GLUCOSE, BEDSIDE - POINT OF CARE 104 (H) 70 - 99 mg/dL   GLUCOSE, BEDSIDE - POINT OF CARE    Collection Time: 01/09/22 11:14 AM   Result Value Ref Range    GLUCOSE,  BEDSIDE - POINT OF CARE 145 (H) 70 - 99 mg/dL       Imaging    US VASC EXTREMITY LOWER VENOUS DUPLEX   Final Result      No evidence of acute DVT of the adequately visualized bilateral lower   extremities as above.       Calf veins are not adequately visualized.      A 5.9 cm complex right popliteal fossa cyst.  Findings may related to   internal hemorrhage.  Correlate with clinical history and exam.      A 5.3 cm left popliteal cyst.      Subcutaneous edema of the right lower extremity.      Electronically Signed by: UNA FERRARI MD    Signed on: 1/9/2022 7:44 AM          CTA ABDOMEN PELVIS W WO CONTRAST   Final Result   1.    Relatively unchanged hematoma involving the left space of Retzius   with subtle ill-defined retroperitoneal hemorrhage.  No active contrast   extravasation is seen on the arterial phase in the vicinity of the left   pelvic hematoma.     2.    Moderate bilateral hydronephrosis and hydroureter is again seen.    This is unchanged.      Electronically Signed by: MIAN ARNOLD D.O.    Signed on: 1/3/2022 11:33 AM          XR CHEST AP OR PA 1 VIEW   Final Result   No evidence of acute cardiopulmonary pathology.  Moderate cardiomegaly.      Electronically Signed by: JAYDEN GUTIERREZ DO    Signed on: 1/3/2022 7:37 AM          CT CYSTOGRAM   Final Result   1.   Stable 1.2 cm hematoma in the left space of Retzius with ill-defined   retroperitoneal hemorrhage extending posteriorly.  No definite   intraperitoneal hemorrhage is identified.   2.    No contrast extravasation is identified in this patient with a   presumed bladder rupture.  The fat plane is effaced between the inferior   anterolateral wall of the urinary bladder and hematoma, also along the left   margin of the prostate.  If the hematoma is due to rupture, suspect that   the rupture would be at either of these locations.  Unremarkable urinary   bladder dome.  Again, no contrast extravasation is identified.   3.    Mild bilateral hydroureter,  likely due to mass effect from the   hematoma.   4.    No pelvic fracture.            Electronically Signed by: CLEMENTINA OSBORNE MD    Signed on: 1/1/2022 1:41 PM          CT ABDOMEN PELVIS WO CONTRAST   Final Result      1.   Mixed intraperitoneal/extraperitoneal bladder rupture with hemorrhage   in/along the urinary bladder, left hemipelvis and tracking into the left   retroperitoneal space and left paracolic gutter. Awan catheter in place.   Small-volume ascites.   2.   Moderate bilateral hydroureteronephrosis, likely due to   obstruction/mass effect on the distal ureters/bladder by hemorrhage.      Electronically Signed by: CARTER URRUTIA MD    Signed on: 12/31/2021 6:21 PM          IR PROCEDURE REQUEST    (Results Pending)       Cultures  Microbiology Results  (Last 10 results in the past 7 days)    Specimen   Gram Smear   Culture Result   Status       01/03/22  0707         Gram positive cocci in clusters.  Comment: Detected from aerobic bottle after 1 Days and 14 hours.                        Primary Care Physician  Kate Ross MD    Code Status    Code Status: Full Resuscitation    Nicolas Kaba MD  [facility] Hospitalist

## 2025-06-06 NOTE — PROGRESS NOTES
Patient received to CPRU room # 17  Ambulatory from [facility]. Patient scheduled for CVN today with Dr Willis. Procedure reviewed & questions answered, voiced good understanding consent obtained & placed on chart. All medications and medical history reviewed. Will prep patient per orders. Patient & family updated on plan of care.      The patient has a fraility score of 3-MANAGING WELL, based on ambulation.      Pt has not missed any doses of Eliquis in the last 30 days. Order changed to CVN.

## 2025-06-09 NOTE — TELEPHONE ENCOUNTER
Elie Willis MD  You3 days ago       It is because of his known LV dysfunction and severe kidney disease.    Elie Willis MD

## 2025-06-10 ENCOUNTER — TELEPHONE (OUTPATIENT)
Age: 70
End: 2025-06-10

## 2025-06-10 RX ORDER — FERROUS GLUCONATE 324(37.5)
324 TABLET ORAL DAILY
Qty: 90 TABLET | Refills: 3 | Status: SHIPPED | OUTPATIENT
Start: 2025-06-10

## 2025-06-10 NOTE — TELEPHONE ENCOUNTER
Pt.notified of MD response.Med escribed as below:  Requested Prescriptions     Signed Prescriptions Disp Refills    ferrous gluconate 324 (37.5 Fe) MG TABS 90 tablet 3     Sig: Take 1 tablet by mouth daily     Authorizing Provider: RADHA STARK     Ordering User: MARK BRANNON

## 2025-06-10 NOTE — TELEPHONE ENCOUNTER
[place], PT with [facility] called stating the patient has the following issues :    JC=896 today  BP=90/64 today  VS within normal range  Pt overall doesn't feel well    Please call and advise.

## 2025-06-10 NOTE — TELEPHONE ENCOUNTER
Patient reports his HR sustained at 115-120 today. Patient was due to an Ablation on 6/6/25 but converted to NSR prior to procedure. Patient has history of recent cardiac arrest. Advised to go to ER for evaluation and treatment of high heart rate. Patient voices understanding.

## 2025-06-12 ENCOUNTER — TELEPHONE (OUTPATIENT)
Dept: FAMILY MEDICINE CLINIC | Facility: CLINIC | Age: 70
End: 2025-06-12

## 2025-06-12 NOTE — TELEPHONE ENCOUNTER
Patient states he has not been taking his insulin due to BS's have been good, between 130-140. Yesterday at 2 pm was 89-ate at noon & 125 at 5 pm. Has been taking the Januvia. Wants to know if he can stay off the insulin. Please advise

## 2025-06-13 ENCOUNTER — TELEPHONE (OUTPATIENT)
Dept: FAMILY MEDICINE CLINIC | Facility: CLINIC | Age: 70
End: 2025-06-13

## 2025-06-13 NOTE — TELEPHONE ENCOUNTER
Patient notified per Dr Gonzales:He can take half of the units of insulin but I would not stop it. Patient understands

## 2025-06-16 ENCOUNTER — TELEPHONE (OUTPATIENT)
Dept: FAMILY MEDICINE CLINIC | Facility: CLINIC | Age: 70
End: 2025-06-16

## 2025-06-16 NOTE — TELEPHONE ENCOUNTER
[facility] requesting a referral for speech therapy & wound center for a pressure sore on his back. They having been working on it but not getting any better. He is requesting the mobile wound care

## 2025-06-16 NOTE — TELEPHONE ENCOUNTER
Pt is needing new prescription sent for his insulin needles.     [facility] [address] [facility].

## 2025-06-18 ENCOUNTER — TELEPHONE (OUTPATIENT)
Dept: FAMILY MEDICINE CLINIC | Facility: CLINIC | Age: 70
End: 2025-06-18

## 2025-06-18 DIAGNOSIS — E11.22 CONTROLLED TYPE 2 DIABETES MELLITUS WITH STAGE 4 CHRONIC KIDNEY DISEASE, WITHOUT LONG-TERM CURRENT USE OF INSULIN (HCC): Primary | ICD-10-CM

## 2025-06-18 DIAGNOSIS — N18.4 CONTROLLED TYPE 2 DIABETES MELLITUS WITH STAGE 4 CHRONIC KIDNEY DISEASE, WITHOUT LONG-TERM CURRENT USE OF INSULIN (HCC): Primary | ICD-10-CM

## 2025-06-18 RX ORDER — PEN NEEDLE, DIABETIC 30 GX3/16"
1 NEEDLE, DISPOSABLE MISCELLANEOUS 3 TIMES DAILY
Qty: 300 EACH | Refills: 3 | Status: SHIPPED | OUTPATIENT
Start: 2025-06-18

## 2025-06-18 NOTE — TELEPHONE ENCOUNTER
Patient states needles have not been sent in.   Patient states his needles are Auto Shield 0.3 mm X 5 mm 30 g x 3/16

## 2025-06-19 ENCOUNTER — TELEPHONE (OUTPATIENT)
Dept: FAMILY MEDICINE CLINIC | Facility: CLINIC | Age: 70
End: 2025-06-19

## 2025-06-19 RX ORDER — HYDROXYZINE HYDROCHLORIDE 25 MG/1
TABLET, FILM COATED ORAL
Qty: 270 TABLET | Refills: 1 | OUTPATIENT
Start: 2025-06-19

## 2025-06-19 NOTE — TELEPHONE ENCOUNTER
Priya states pressure sore on spine lower back is not improving. They have been cleaning it with N/S, applying Medihoney & bandage. Patient using a foam pillow to keep pressure off. [facility] will come to patients home to treat the wound. Also need a referral for speech [place]

## 2025-06-20 DIAGNOSIS — L89.102 PRESSURE INJURY OF BACK, STAGE 2 (HCC): Primary | ICD-10-CM

## 2025-06-25 DIAGNOSIS — R49.0 VOICE HOARSENESS: Primary | ICD-10-CM

## 2025-06-30 ENCOUNTER — HOSPITAL ENCOUNTER (INPATIENT)
Age: 70
LOS: 2 days | Discharge: HOME | DRG: 641 | End: 2025-07-02
Attending: EMERGENCY MEDICINE | Admitting: FAMILY MEDICINE
Payer: MEDICARE

## 2025-06-30 ENCOUNTER — APPOINTMENT (OUTPATIENT)
Dept: GENERAL RADIOLOGY | Age: 70
DRG: 641 | End: 2025-06-30
Payer: MEDICARE

## 2025-06-30 DIAGNOSIS — E87.5 HYPERKALEMIA: Primary | ICD-10-CM

## 2025-06-30 DIAGNOSIS — N18.9 CHRONIC KIDNEY DISEASE, UNSPECIFIED CKD STAGE: ICD-10-CM

## 2025-06-30 LAB
ALBUMIN SERPL-MCNC: 3.2 G/DL (ref 3.2–4.6)
ALBUMIN/GLOB SERPL: 0.6 (ref 1–1.9)
ALP SERPL-CCNC: 151 U/L (ref 40–129)
ALT SERPL-CCNC: 18 U/L (ref 8–55)
ANION GAP BLD CALC-SCNC: 1.2 MMOL/L
ANION GAP SERPL CALC-SCNC: 19 MMOL/L (ref 7–16)
AST SERPL-CCNC: 59 U/L (ref 15–37)
BASOPHILS # BLD: 0.02 K/UL (ref 0–0.2)
BASOPHILS NFR BLD: 0.3 % (ref 0–2)
BILIRUB SERPL-MCNC: 0.5 MG/DL (ref 0–1.2)
BUN BLD-MCNC: 79 MG/DL (ref 8–26)
BUN SERPL-MCNC: 52 MG/DL (ref 8–23)
CALCIUM SERPL-MCNC: 10.9 MG/DL (ref 8.8–10.2)
CHLORIDE BLD-SCNC: 96 MMOL/L (ref 98–107)
CHLORIDE SERPL-SCNC: 92 MMOL/L (ref 98–107)
CO2 BLD-SCNC: 34.8 MMOL/L (ref 21–32)
CO2 SERPL-SCNC: 25 MMOL/L (ref 20–29)
CREAT BLD-MCNC: 3.13 MG/DL (ref 0.8–1.5)
CREAT SERPL-MCNC: 3.11 MG/DL (ref 0.8–1.3)
DIFFERENTIAL METHOD BLD: ABNORMAL
EKG ATRIAL RATE: 96 BPM
EKG DIAGNOSIS: NORMAL
EKG P AXIS: 83 DEGREES
EKG P-R INTERVAL: 175 MS
EKG Q-T INTERVAL: 391 MS
EKG QRS DURATION: 97 MS
EKG QTC CALCULATION (BAZETT): 489 MS
EKG R AXIS: 110 DEGREES
EKG T AXIS: -67 DEGREES
EKG VENTRICULAR RATE: 94 BPM
EOSINOPHIL # BLD: 0 K/UL (ref 0–0.8)
EOSINOPHIL NFR BLD: 0 % (ref 0.5–7.8)
ERYTHROCYTE [DISTWIDTH] IN BLOOD BY AUTOMATED COUNT: 19.8 % (ref 11.9–14.6)
GLOBULIN SER CALC-MCNC: 5.6 G/DL (ref 2.3–3.5)
GLUCOSE BLD STRIP.AUTO-MCNC: 105 MG/DL (ref 65–100)
GLUCOSE BLD STRIP.AUTO-MCNC: 146 MG/DL (ref 65–100)
GLUCOSE BLD STRIP.AUTO-MCNC: 96 MG/DL (ref 65–100)
GLUCOSE BLD-MCNC: 96 MG/DL (ref 65–100)
GLUCOSE SERPL-MCNC: 101 MG/DL (ref 70–99)
HCT VFR BLD AUTO: 53.4 % (ref 41.1–50.3)
HGB BLD-MCNC: 16.5 G/DL (ref 13.6–17.2)
IMM GRANULOCYTES # BLD AUTO: 0.02 K/UL (ref 0–0.5)
IMM GRANULOCYTES NFR BLD AUTO: 0.3 % (ref 0–5)
LIPASE SERPL-CCNC: 46 U/L (ref 13–60)
LYMPHOCYTES # BLD: 1.36 K/UL (ref 0.5–4.6)
LYMPHOCYTES NFR BLD: 18.2 % (ref 13–44)
MCH RBC QN AUTO: 26 PG (ref 26.1–32.9)
MCHC RBC AUTO-ENTMCNC: 30.9 G/DL (ref 31.4–35)
MCV RBC AUTO: 84.1 FL (ref 82–102)
MONOCYTES # BLD: 0.66 K/UL (ref 0.1–1.3)
MONOCYTES NFR BLD: 8.8 % (ref 4–12)
NEUTS SEG # BLD: 5.4 K/UL (ref 1.7–8.2)
NEUTS SEG NFR BLD: 72.4 % (ref 43–78)
NRBC # BLD: 0 K/UL (ref 0–0.2)
PLATELET # BLD AUTO: 184 K/UL (ref 150–450)
PMV BLD AUTO: 9.4 FL (ref 9.4–12.3)
POTASSIUM BLD-SCNC: 8.3 MMOL/L (ref 3.5–5.1)
POTASSIUM SERPL-SCNC: 2.7 MMOL/L (ref 3.5–5.1)
POTASSIUM SERPL-SCNC: ABNORMAL MMOL/L (ref 3.5–5.1)
PROT SERPL-MCNC: 8.7 G/DL (ref 6.3–8.2)
RBC # BLD AUTO: 6.35 M/UL (ref 4.23–5.6)
SERVICE CMNT-IMP: ABNORMAL
SERVICE CMNT-IMP: ABNORMAL
SERVICE CMNT-IMP: NORMAL
SODIUM BLD-SCNC: 132 MMOL/L (ref 136–145)
SODIUM SERPL-SCNC: 136 MMOL/L (ref 136–145)
WBC # BLD AUTO: 7.5 K/UL (ref 4.3–11.1)

## 2025-06-30 PROCEDURE — 6360000002 HC RX W HCPCS: Performed by: EMERGENCY MEDICINE

## 2025-06-30 PROCEDURE — 96374 THER/PROPH/DIAG INJ IV PUSH: CPT

## 2025-06-30 PROCEDURE — 85025 COMPLETE CBC W/AUTO DIFF WBC: CPT

## 2025-06-30 PROCEDURE — 2580000003 HC RX 258: Performed by: FAMILY MEDICINE

## 2025-06-30 PROCEDURE — 84132 ASSAY OF SERUM POTASSIUM: CPT

## 2025-06-30 PROCEDURE — 2580000003 HC RX 258: Performed by: EMERGENCY MEDICINE

## 2025-06-30 PROCEDURE — 80053 COMPREHEN METABOLIC PANEL: CPT

## 2025-06-30 PROCEDURE — 6360000002 HC RX W HCPCS: Performed by: FAMILY MEDICINE

## 2025-06-30 PROCEDURE — 80047 BASIC METABLC PNL IONIZED CA: CPT

## 2025-06-30 PROCEDURE — 6370000000 HC RX 637 (ALT 250 FOR IP): Performed by: FAMILY MEDICINE

## 2025-06-30 PROCEDURE — 96375 TX/PRO/DX INJ NEW DRUG ADDON: CPT

## 2025-06-30 PROCEDURE — 6370000000 HC RX 637 (ALT 250 FOR IP): Performed by: EMERGENCY MEDICINE

## 2025-06-30 PROCEDURE — 6360000002 HC RX W HCPCS: Performed by: NURSE PRACTITIONER

## 2025-06-30 PROCEDURE — 93010 ELECTROCARDIOGRAM REPORT: CPT | Performed by: INTERNAL MEDICINE

## 2025-06-30 PROCEDURE — 2100000001 HC CVICU R&B

## 2025-06-30 PROCEDURE — 71045 X-RAY EXAM CHEST 1 VIEW: CPT

## 2025-06-30 PROCEDURE — 94640 AIRWAY INHALATION TREATMENT: CPT

## 2025-06-30 PROCEDURE — 93005 ELECTROCARDIOGRAM TRACING: CPT | Performed by: EMERGENCY MEDICINE

## 2025-06-30 PROCEDURE — 99285 EMERGENCY DEPT VISIT HI MDM: CPT

## 2025-06-30 PROCEDURE — 2500000003 HC RX 250 WO HCPCS: Performed by: FAMILY MEDICINE

## 2025-06-30 PROCEDURE — 36415 COLL VENOUS BLD VENIPUNCTURE: CPT

## 2025-06-30 PROCEDURE — 83690 ASSAY OF LIPASE: CPT

## 2025-06-30 PROCEDURE — 82962 GLUCOSE BLOOD TEST: CPT

## 2025-06-30 RX ORDER — TRAZODONE HYDROCHLORIDE 50 MG/1
50 TABLET ORAL NIGHTLY
Status: DISCONTINUED | OUTPATIENT
Start: 2025-06-30 | End: 2025-07-02 | Stop reason: HOSPADM

## 2025-06-30 RX ORDER — POTASSIUM CHLORIDE 1500 MG/1
40 TABLET, EXTENDED RELEASE ORAL
Status: COMPLETED | OUTPATIENT
Start: 2025-06-30 | End: 2025-07-01

## 2025-06-30 RX ORDER — ACETAMINOPHEN 650 MG/1
650 SUPPOSITORY RECTAL EVERY 6 HOURS PRN
Status: DISCONTINUED | OUTPATIENT
Start: 2025-06-30 | End: 2025-07-02 | Stop reason: HOSPADM

## 2025-06-30 RX ORDER — SODIUM CHLORIDE 0.9 % (FLUSH) 0.9 %
5-40 SYRINGE (ML) INJECTION PRN
Status: DISCONTINUED | OUTPATIENT
Start: 2025-06-30 | End: 2025-07-02 | Stop reason: HOSPADM

## 2025-06-30 RX ORDER — PANTOPRAZOLE SODIUM 40 MG/1
40 TABLET, DELAYED RELEASE ORAL
Status: DISCONTINUED | OUTPATIENT
Start: 2025-07-01 | End: 2025-07-02 | Stop reason: HOSPADM

## 2025-06-30 RX ORDER — ATORVASTATIN CALCIUM 80 MG/1
80 TABLET, FILM COATED ORAL DAILY
Status: DISCONTINUED | OUTPATIENT
Start: 2025-07-01 | End: 2025-07-02 | Stop reason: HOSPADM

## 2025-06-30 RX ORDER — ALBUTEROL SULFATE 5 MG/ML
10 SOLUTION RESPIRATORY (INHALATION) ONCE
Status: COMPLETED | OUTPATIENT
Start: 2025-06-30 | End: 2025-06-30

## 2025-06-30 RX ORDER — METOPROLOL SUCCINATE 25 MG/1
12.5 TABLET, EXTENDED RELEASE ORAL DAILY
Status: DISCONTINUED | OUTPATIENT
Start: 2025-07-01 | End: 2025-07-02 | Stop reason: HOSPADM

## 2025-06-30 RX ORDER — HYDROXYZINE HYDROCHLORIDE 25 MG/1
25 TABLET, FILM COATED ORAL EVERY 8 HOURS PRN
Status: DISCONTINUED | OUTPATIENT
Start: 2025-06-30 | End: 2025-07-02 | Stop reason: HOSPADM

## 2025-06-30 RX ORDER — DEXTROSE MONOHYDRATE 100 MG/ML
INJECTION, SOLUTION INTRAVENOUS CONTINUOUS PRN
Status: DISCONTINUED | OUTPATIENT
Start: 2025-06-30 | End: 2025-07-02 | Stop reason: HOSPADM

## 2025-06-30 RX ORDER — TORSEMIDE 100 MG/1
100 TABLET ORAL DAILY
Status: DISCONTINUED | OUTPATIENT
Start: 2025-07-01 | End: 2025-07-02 | Stop reason: HOSPADM

## 2025-06-30 RX ORDER — POTASSIUM CHLORIDE 7.45 MG/ML
10 INJECTION INTRAVENOUS
Status: COMPLETED | OUTPATIENT
Start: 2025-06-30 | End: 2025-07-01

## 2025-06-30 RX ORDER — 0.9 % SODIUM CHLORIDE 0.9 %
1000 INTRAVENOUS SOLUTION INTRAVENOUS ONCE
Status: COMPLETED | OUTPATIENT
Start: 2025-06-30 | End: 2025-06-30

## 2025-06-30 RX ORDER — SODIUM CHLORIDE 9 MG/ML
INJECTION, SOLUTION INTRAVENOUS PRN
Status: DISCONTINUED | OUTPATIENT
Start: 2025-06-30 | End: 2025-07-02 | Stop reason: HOSPADM

## 2025-06-30 RX ORDER — BUDESONIDE 0.25 MG/2ML
0.25 INHALANT ORAL
Status: DISCONTINUED | OUTPATIENT
Start: 2025-06-30 | End: 2025-07-01

## 2025-06-30 RX ORDER — CLOPIDOGREL BISULFATE 75 MG/1
75 TABLET ORAL DAILY
Status: DISCONTINUED | OUTPATIENT
Start: 2025-07-01 | End: 2025-07-02 | Stop reason: HOSPADM

## 2025-06-30 RX ORDER — FERROUS GLUCONATE 324(38)MG
324 TABLET ORAL DAILY
Status: DISCONTINUED | OUTPATIENT
Start: 2025-07-01 | End: 2025-07-02 | Stop reason: HOSPADM

## 2025-06-30 RX ORDER — FAMOTIDINE 20 MG/1
10 TABLET, FILM COATED ORAL DAILY PRN
Status: DISCONTINUED | OUTPATIENT
Start: 2025-06-30 | End: 2025-07-02 | Stop reason: HOSPADM

## 2025-06-30 RX ORDER — DEXTROSE MONOHYDRATE 100 MG/ML
INJECTION, SOLUTION INTRAVENOUS CONTINUOUS PRN
Status: DISCONTINUED | OUTPATIENT
Start: 2025-06-30 | End: 2025-06-30 | Stop reason: SDUPTHER

## 2025-06-30 RX ORDER — ARFORMOTEROL TARTRATE 15 UG/2ML
15 SOLUTION RESPIRATORY (INHALATION)
Status: DISCONTINUED | OUTPATIENT
Start: 2025-06-30 | End: 2025-07-01

## 2025-06-30 RX ORDER — POLYETHYLENE GLYCOL 3350 17 G/17G
17 POWDER, FOR SOLUTION ORAL DAILY PRN
Status: DISCONTINUED | OUTPATIENT
Start: 2025-06-30 | End: 2025-07-02 | Stop reason: HOSPADM

## 2025-06-30 RX ORDER — MAGNESIUM HYDROXIDE/ALUMINUM HYDROXICE/SIMETHICONE 120; 1200; 1200 MG/30ML; MG/30ML; MG/30ML
30 SUSPENSION ORAL EVERY 6 HOURS PRN
Status: DISCONTINUED | OUTPATIENT
Start: 2025-06-30 | End: 2025-07-02 | Stop reason: HOSPADM

## 2025-06-30 RX ORDER — INSULIN LISPRO 100 [IU]/ML
0-4 INJECTION, SOLUTION INTRAVENOUS; SUBCUTANEOUS
Status: DISCONTINUED | OUTPATIENT
Start: 2025-06-30 | End: 2025-07-02 | Stop reason: HOSPADM

## 2025-06-30 RX ORDER — IBUPROFEN 600 MG/1
1 TABLET ORAL PRN
Status: DISCONTINUED | OUTPATIENT
Start: 2025-06-30 | End: 2025-07-02 | Stop reason: HOSPADM

## 2025-06-30 RX ORDER — CALCIUM GLUCONATE 20 MG/ML
1000 INJECTION, SOLUTION INTRAVENOUS ONCE
Status: COMPLETED | OUTPATIENT
Start: 2025-06-30 | End: 2025-06-30

## 2025-06-30 RX ORDER — SODIUM CHLORIDE 0.9 % (FLUSH) 0.9 %
5-40 SYRINGE (ML) INJECTION EVERY 12 HOURS SCHEDULED
Status: DISCONTINUED | OUTPATIENT
Start: 2025-06-30 | End: 2025-07-02 | Stop reason: HOSPADM

## 2025-06-30 RX ORDER — FUROSEMIDE 10 MG/ML
40 INJECTION INTRAMUSCULAR; INTRAVENOUS ONCE
Status: COMPLETED | OUTPATIENT
Start: 2025-06-30 | End: 2025-06-30

## 2025-06-30 RX ORDER — IBUPROFEN 600 MG/1
1 TABLET ORAL PRN
Status: DISCONTINUED | OUTPATIENT
Start: 2025-06-30 | End: 2025-06-30 | Stop reason: SDUPTHER

## 2025-06-30 RX ORDER — ACETAMINOPHEN 325 MG/1
650 TABLET ORAL EVERY 6 HOURS PRN
Status: DISCONTINUED | OUTPATIENT
Start: 2025-06-30 | End: 2025-07-02 | Stop reason: HOSPADM

## 2025-06-30 RX ORDER — BISACODYL 10 MG
10 SUPPOSITORY, RECTAL RECTAL DAILY PRN
Status: DISCONTINUED | OUTPATIENT
Start: 2025-06-30 | End: 2025-07-02 | Stop reason: HOSPADM

## 2025-06-30 RX ADMIN — SODIUM CHLORIDE 1000 ML: 0.9 INJECTION, SOLUTION INTRAVENOUS at 16:48

## 2025-06-30 RX ADMIN — INSULIN HUMAN 10 UNITS: 100 INJECTION, SOLUTION PARENTERAL at 17:12

## 2025-06-30 RX ADMIN — CALCIUM GLUCONATE 1000 MG: 20 INJECTION, SOLUTION INTRAVENOUS at 16:53

## 2025-06-30 RX ADMIN — SODIUM ZIRCONIUM CYCLOSILICATE 10 G: 10 POWDER, FOR SUSPENSION ORAL at 17:54

## 2025-06-30 RX ADMIN — SODIUM CHLORIDE, PRESERVATIVE FREE 10 ML: 5 INJECTION INTRAVENOUS at 19:15

## 2025-06-30 RX ADMIN — TRAZODONE HYDROCHLORIDE 50 MG: 50 TABLET ORAL at 23:30

## 2025-06-30 RX ADMIN — ALBUTEROL SULFATE 10 MG: 2.5 SOLUTION RESPIRATORY (INHALATION) at 17:44

## 2025-06-30 RX ADMIN — APIXABAN 5 MG: 5 TABLET, FILM COATED ORAL at 23:30

## 2025-06-30 RX ADMIN — FUROSEMIDE 40 MG: 10 INJECTION, SOLUTION INTRAMUSCULAR; INTRAVENOUS at 17:05

## 2025-06-30 RX ADMIN — DEXTROSE 250 ML: 10 SOLUTION INTRAVENOUS at 16:57

## 2025-06-30 RX ADMIN — SODIUM CHLORIDE: 0.9 INJECTION, SOLUTION INTRAVENOUS at 22:05

## 2025-06-30 RX ADMIN — POTASSIUM CHLORIDE 10 MEQ: 7.46 INJECTION, SOLUTION INTRAVENOUS at 22:06

## 2025-06-30 ASSESSMENT — PAIN - FUNCTIONAL ASSESSMENT: PAIN_FUNCTIONAL_ASSESSMENT: 0-10

## 2025-06-30 ASSESSMENT — PAIN SCALES - GENERAL
PAINLEVEL_OUTOF10: 0

## 2025-06-30 ASSESSMENT — LIFESTYLE VARIABLES
HOW OFTEN DO YOU HAVE A DRINK CONTAINING ALCOHOL: NEVER
HOW MANY STANDARD DRINKS CONTAINING ALCOHOL DO YOU HAVE ON A TYPICAL DAY: PATIENT DOES NOT DRINK

## 2025-06-30 NOTE — ED TRIAGE NOTES
Arrives via EMS from home.     STEMI in march this year. ICD placed 4/8/25. Recently discharged from rehab approx one month ago. States since ICD placed, patient has not returned to baseline physically. States feels fatigued and not able to do as prior to having ICD placed. Pt also reports diarrhea, no abd pain n/v.Pt denies any chest pain or SHOB.

## 2025-06-30 NOTE — ED NOTES
TRANSFER - OUT REPORT:    Verbal report given to SHANNON Bah on Sin Narayan  being transferred to CVICU 104 for routine progression of patient care       Report consisted of patient's Situation, Background, Assessment and   Recommendations(SBAR).     Information from the following report(s) Nurse Handoff Report was reviewed with the receiving nurse.    [place] Fall Assessment:    Presents to emergency department  because of falls (Syncope, seizure, or loss of consciousness): No  Age > 70: No  Altered Mental Status, Intoxication with alcohol or substance confusion (Disorientation, impaired judgment, poor safety awaremess, or inability to follow instructions): No  Impaired Mobility: Ambulates or transfers with assistive devices or assistance; Unable to ambulate or transer.: No  Nursing Judgement: Yes          Lines:   Peripheral IV 06/30/25 Right;Anterior Forearm (Active)       Peripheral IV 06/30/25 Left Antecubital (Active)   Site Assessment Clean, dry & intact 06/30/25 1704   Line Status Normal saline locked;Blood return noted 06/30/25 1704   Phlebitis Assessment No symptoms 06/30/25 1704   Infiltration Assessment 0 06/30/25 1704   Alcohol Cap Used Yes 06/30/25 1704   Dressing Status Clean, dry & intact 06/30/25 1704   Dressing Type Transparent 06/30/25 1704        Opportunity for questions and clarification was provided.      Patient transported with:  Monitor          Andreina Brasher RN  06/30/25 0593

## 2025-06-30 NOTE — H&P
Hospitalist History and Physical   Admit Date:  2025 12:50 PM   Name:  Sin Narayan   Age:  70 y.o.  Sex:  male  :  1955   MRN:  [id number]   Room:  ER29/29    Presenting/Chief Complaint: Fatigue     Reason(s) for Admission: Hyperkalemia [E87.5]     History of Present Illness:   Sin Narayan is a 70 y.o. male who presented to the ED for cc generalized weakness since having an MI March of this year. Nothing seems to make better or worse. K found to be 8.3.     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 1L NS ordered in ER, but I would hold off on giving any more fluids due to his poor EF. Albuterol. Add Lokelma. Repeat K levels this evening. No peaked T waves on EKG and he has no chest pain. Will consult nephrology to see in the AM. ICU admit for now until I can see a downward trend with K levels. Holding his K supplement. I am sure his worsening renal function is contributing to his hyperkalemia.     Of note, he had dark diarrhea this AM but denies actual blood. Will not order stool studies currently, but if persists, will order stool studies.     CKD IV - complicates ability to have stable electrolytes. Consult nephrology for further recs. It seems over the past year his baseline creatine has increased from 2.4 to 3.     DM type II - SS. He denies taking Lantus due to low glucose levels.     sCHF - remote tele. Demadex, BB    CAD- plavix, Eliquis, statin    HLD - statin     Factor 5 leiden - Eliquis    Anxiety - PRN atarax    PPI    Trazodone     Poor historian and does not know his medications well, this increases risks of complications.     PT/OT evals ordered?  Not ordered; patient not expected to need rehab  Diet: ADULT DIET; Regular; 4

## 2025-06-30 NOTE — ED PROVIDER NOTES
Emergency Department Provider Note       [facility] EMERGENCY DEPT   PCP: Lety Gonzales MD   Age: 70 y.o.   Sex: male     DISPOSITION    No diagnosis found.    Medical Decision Making     Patient is being evaluated for generalized fatigue.  He will be worked up for any metabolic, electrolyte, infectious process.  However did have a lengthy discussion with the patient that rebounding from his heart attack is going to take a significant amount of time and that this may be a normal course and progression.  ED Course as of 06/30/25 1639   Mon Jun 30, 2025   1448 XR CHEST PORTABLE  X-ray of the chest per my independent interpretation shows pacemaker new from our most recent imaging.  Small right pleural effusion.  No infiltrate, cardiomegaly, pulmonary edema. [MELLISA]   1505 Patient's metabolic panel shows chronic renal dysfunction at his baseline.  Potassium is hemolyzed so this will be redrawn. [MELLISA]   1632 POC Potassium(!): 8.3  Patient ordered IV fluids, Lasix, insulin, and glucose.  Did not have any EKG changes however with potassium of 8.3 I have ordered of calcium. [MELLISA]      ED Course User Index  [MELLISA] Lindy Sandhu, DO     1 or more acute illnesses that pose a threat to life or bodily function.   Discussion with external consultants.  Shared medical decision making was utilized in creating the patients health plan today.  I independently ordered and reviewed each unique test.         ED cardiac monitoring rhythm strip was ordered and interpreted:  sinus rhythm, no evidence of an arrhythmia  ST Segments:Nonspecific ST segments - NO STEMI   Rate: 94      The patient was admitted and I have discussed patient management with the admitting provider.    Critical care procedure note : 75 minutes of critical care time was performed in the emergency department. This was separate from any other procedures listed during the patients emergency department course. The failure to initiate these interventions on an urgent

## 2025-06-30 NOTE — ACP (ADVANCE CARE PLANNING)
Advance Care Planning Note   Admit Date:  2025 12:50 PM   Name:  Sin Narayan   Age:  70 y.o.  Sex:  male  :  1955   MRN:  [id number]   Room:  [facility]/    Sin Narayan has capacity to make his own decisions:   Yes    Other people present:   sister    Patient / surrogate decision-maker directed code status:  Full    Originally was DNR, but then changed his mind    Patient or surrogate consented to discussion of the current conditions, workup, management plans, prognosis, and the risk for further deterioration.  Time spent: 17 minutes in direct discussion.      Signed:  MONIQUE RIVERS DO

## 2025-07-01 PROBLEM — E87.6 HYPOKALEMIA: Status: ACTIVE | Noted: 2025-07-01

## 2025-07-01 PROBLEM — E44.0 MODERATE PROTEIN-CALORIE MALNUTRITION: Status: ACTIVE | Noted: 2025-07-01

## 2025-07-01 LAB
ANION GAP SERPL CALC-SCNC: 15 MMOL/L (ref 7–16)
BASOPHILS # BLD: 0.03 K/UL (ref 0–0.2)
BASOPHILS NFR BLD: 0.4 % (ref 0–2)
BUN SERPL-MCNC: 55 MG/DL (ref 8–23)
CALCIUM SERPL-MCNC: 9.8 MG/DL (ref 8.8–10.2)
CHLORIDE SERPL-SCNC: 98 MMOL/L (ref 98–107)
CO2 SERPL-SCNC: 25 MMOL/L (ref 20–29)
CREAT SERPL-MCNC: 3.02 MG/DL (ref 0.8–1.3)
DIFFERENTIAL METHOD BLD: ABNORMAL
EOSINOPHIL # BLD: 0.1 K/UL (ref 0–0.8)
EOSINOPHIL NFR BLD: 1.4 % (ref 0.5–7.8)
ERYTHROCYTE [DISTWIDTH] IN BLOOD BY AUTOMATED COUNT: 18.6 % (ref 11.9–14.6)
GLUCOSE BLD STRIP.AUTO-MCNC: 109 MG/DL (ref 65–100)
GLUCOSE BLD STRIP.AUTO-MCNC: 116 MG/DL (ref 65–100)
GLUCOSE BLD STRIP.AUTO-MCNC: 140 MG/DL (ref 65–100)
GLUCOSE SERPL-MCNC: 72 MG/DL (ref 70–99)
HCT VFR BLD AUTO: 41.1 % (ref 41.1–50.3)
HGB BLD-MCNC: 12.8 G/DL (ref 13.6–17.2)
IMM GRANULOCYTES # BLD AUTO: 0.01 K/UL (ref 0–0.5)
IMM GRANULOCYTES NFR BLD AUTO: 0.1 % (ref 0–5)
LYMPHOCYTES # BLD: 1.97 K/UL (ref 0.5–4.6)
LYMPHOCYTES NFR BLD: 27.1 % (ref 13–44)
MAGNESIUM SERPL-MCNC: 2.1 MG/DL (ref 1.8–2.4)
MCH RBC QN AUTO: 26.2 PG (ref 26.1–32.9)
MCHC RBC AUTO-ENTMCNC: 31.1 G/DL (ref 31.4–35)
MCV RBC AUTO: 84.2 FL (ref 82–102)
MONOCYTES # BLD: 0.87 K/UL (ref 0.1–1.3)
MONOCYTES NFR BLD: 12 % (ref 4–12)
NEUTS SEG # BLD: 4.28 K/UL (ref 1.7–8.2)
NEUTS SEG NFR BLD: 59 % (ref 43–78)
NRBC # BLD: 0 K/UL (ref 0–0.2)
PLATELET # BLD AUTO: 183 K/UL (ref 150–450)
PMV BLD AUTO: 9.7 FL (ref 9.4–12.3)
POTASSIUM SERPL-SCNC: 3.1 MMOL/L (ref 3.5–5.1)
POTASSIUM SERPL-SCNC: 3.1 MMOL/L (ref 3.5–5.1)
POTASSIUM SERPL-SCNC: 3.8 MMOL/L (ref 3.5–5.1)
RBC # BLD AUTO: 4.88 M/UL (ref 4.23–5.6)
SERVICE CMNT-IMP: ABNORMAL
SODIUM SERPL-SCNC: 139 MMOL/L (ref 136–145)
WBC # BLD AUTO: 7.3 K/UL (ref 4.3–11.1)

## 2025-07-01 PROCEDURE — 94760 N-INVAS EAR/PLS OXIMETRY 1: CPT

## 2025-07-01 PROCEDURE — 36415 COLL VENOUS BLD VENIPUNCTURE: CPT

## 2025-07-01 PROCEDURE — 85025 COMPLETE CBC W/AUTO DIFF WBC: CPT

## 2025-07-01 PROCEDURE — 84132 ASSAY OF SERUM POTASSIUM: CPT

## 2025-07-01 PROCEDURE — 97535 SELF CARE MNGMENT TRAINING: CPT

## 2025-07-01 PROCEDURE — 82962 GLUCOSE BLOOD TEST: CPT

## 2025-07-01 PROCEDURE — 83735 ASSAY OF MAGNESIUM: CPT

## 2025-07-01 PROCEDURE — 97530 THERAPEUTIC ACTIVITIES: CPT

## 2025-07-01 PROCEDURE — 97112 NEUROMUSCULAR REEDUCATION: CPT

## 2025-07-01 PROCEDURE — 6360000002 HC RX W HCPCS: Performed by: FAMILY MEDICINE

## 2025-07-01 PROCEDURE — 2100000001 HC CVICU R&B

## 2025-07-01 PROCEDURE — 80048 BASIC METABOLIC PNL TOTAL CA: CPT

## 2025-07-01 PROCEDURE — 6370000000 HC RX 637 (ALT 250 FOR IP): Performed by: FAMILY MEDICINE

## 2025-07-01 PROCEDURE — 2500000003 HC RX 250 WO HCPCS: Performed by: FAMILY MEDICINE

## 2025-07-01 PROCEDURE — 6360000002 HC RX W HCPCS: Performed by: NURSE PRACTITIONER

## 2025-07-01 PROCEDURE — 97165 OT EVAL LOW COMPLEX 30 MIN: CPT

## 2025-07-01 PROCEDURE — 97161 PT EVAL LOW COMPLEX 20 MIN: CPT

## 2025-07-01 PROCEDURE — 94640 AIRWAY INHALATION TREATMENT: CPT

## 2025-07-01 RX ORDER — ARFORMOTEROL TARTRATE 15 UG/2ML
15 SOLUTION RESPIRATORY (INHALATION)
Status: DISCONTINUED | OUTPATIENT
Start: 2025-07-01 | End: 2025-07-01

## 2025-07-01 RX ORDER — POTASSIUM CHLORIDE 1500 MG/1
40 TABLET, EXTENDED RELEASE ORAL EVERY 4 HOURS
Status: COMPLETED | OUTPATIENT
Start: 2025-07-01 | End: 2025-07-01

## 2025-07-01 RX ORDER — BUDESONIDE 0.25 MG/2ML
0.25 INHALANT ORAL
Status: DISCONTINUED | OUTPATIENT
Start: 2025-07-01 | End: 2025-07-02 | Stop reason: HOSPADM

## 2025-07-01 RX ORDER — BUDESONIDE 0.25 MG/2ML
0.25 INHALANT ORAL
Status: DISCONTINUED | OUTPATIENT
Start: 2025-07-01 | End: 2025-07-01

## 2025-07-01 RX ORDER — ARFORMOTEROL TARTRATE 15 UG/2ML
15 SOLUTION RESPIRATORY (INHALATION)
Status: DISCONTINUED | OUTPATIENT
Start: 2025-07-01 | End: 2025-07-02 | Stop reason: HOSPADM

## 2025-07-01 RX ADMIN — FERROUS GLUCONATE 324 MG: 324 TABLET ORAL at 08:15

## 2025-07-01 RX ADMIN — POTASSIUM CHLORIDE 40 MEQ: 1500 TABLET, EXTENDED RELEASE ORAL at 11:28

## 2025-07-01 RX ADMIN — PANTOPRAZOLE SODIUM 40 MG: 40 TABLET, DELAYED RELEASE ORAL at 07:17

## 2025-07-01 RX ADMIN — POTASSIUM CHLORIDE 10 MEQ: 7.46 INJECTION, SOLUTION INTRAVENOUS at 00:21

## 2025-07-01 RX ADMIN — POTASSIUM CHLORIDE 40 MEQ: 1500 TABLET, EXTENDED RELEASE ORAL at 08:13

## 2025-07-01 RX ADMIN — IPRATROPIUM BROMIDE 0.5 MG: 0.5 SOLUTION RESPIRATORY (INHALATION) at 10:28

## 2025-07-01 RX ADMIN — ARFORMOTEROL TARTRATE 15 MCG: 15 SOLUTION RESPIRATORY (INHALATION) at 10:28

## 2025-07-01 RX ADMIN — TRAZODONE HYDROCHLORIDE 50 MG: 50 TABLET ORAL at 22:01

## 2025-07-01 RX ADMIN — SODIUM CHLORIDE, PRESERVATIVE FREE 10 ML: 5 INJECTION INTRAVENOUS at 07:18

## 2025-07-01 RX ADMIN — POTASSIUM CHLORIDE 10 MEQ: 7.46 INJECTION, SOLUTION INTRAVENOUS at 04:31

## 2025-07-01 RX ADMIN — BUDESONIDE 250 MCG: 0.25 SUSPENSION RESPIRATORY (INHALATION) at 20:09

## 2025-07-01 RX ADMIN — ATORVASTATIN CALCIUM 80 MG: 80 TABLET, FILM COATED ORAL at 08:16

## 2025-07-01 RX ADMIN — ARFORMOTEROL TARTRATE 15 MCG: 15 SOLUTION RESPIRATORY (INHALATION) at 20:09

## 2025-07-01 RX ADMIN — POTASSIUM CHLORIDE 10 MEQ: 7.46 INJECTION, SOLUTION INTRAVENOUS at 02:14

## 2025-07-01 RX ADMIN — SODIUM CHLORIDE, PRESERVATIVE FREE 10 ML: 5 INJECTION INTRAVENOUS at 20:28

## 2025-07-01 RX ADMIN — APIXABAN 5 MG: 5 TABLET, FILM COATED ORAL at 08:15

## 2025-07-01 RX ADMIN — APIXABAN 5 MG: 5 TABLET, FILM COATED ORAL at 20:28

## 2025-07-01 RX ADMIN — CLOPIDOGREL BISULFATE 75 MG: 75 TABLET, FILM COATED ORAL at 08:16

## 2025-07-01 RX ADMIN — BUDESONIDE 250 MCG: 0.25 SUSPENSION RESPIRATORY (INHALATION) at 10:28

## 2025-07-01 RX ADMIN — METOPROLOL SUCCINATE 12.5 MG: 25 TABLET, EXTENDED RELEASE ORAL at 08:15

## 2025-07-01 ASSESSMENT — PAIN SCALES - GENERAL
PAINLEVEL_OUTOF10: 0

## 2025-07-01 NOTE — CONSULTS
[facility] consultation    We were asked to see the patient at the request of   for evaluation of     Admission Date:  6/30/2025    Admission Diagnosis:  Hyperkalemia [E87.5]  Chronic kidney disease, unspecified CKD stage [N18.9]    History of Present Illness:    70-year-old male with past medical history of CAD, V-fib status post ICD on 4/2025, DM, HLD, hypertension, factor V Leyden on Eliquis, post MI pericarditis, CHF with EF 35%, CKD stage IV admitted for generalized weakness.  In ER his potassium was 8.3.  He was treated with calcium gluconate, Lasix, insulin dextrose and IV fluid, Lokelma.  EKG did not showed peaked T waves.      CMP showed hemolyzed sample , POC shows a potassium of 8.3. After all the treatment he became hypokalemic with a potassium of 2.7 and today it has improved to 3.1. Questionable initial POC results are error      He was seen in our office in 5/20/2025 with a creatinine of 2.4 and EGFR 28    Ultrasound of the kidney done in April 2024 showed multiple bilateral renal cyst, questionable solid mass with the upper left quadrant abutting the left kidney and spleen which was followed up with an CT abdomen pelvis with contrast in May 2024 showed the mass seen in the recent ultrasound correlates with a benign prominent medial lobe of the spleen which abuts the kidney    Past Medical History:   Diagnosis Date    Back disorder     H/O blood clots     Heart attack (HCC)     Hypercholesterolemia     Hypertension       Past Surgical History:   Procedure Laterality Date    WISDOM TOOTH EXTRACTION Bilateral       Current Facility-Administered Medications   Medication Dose Route Frequency    potassium chloride (KLOR-CON M) extended release tablet 40 mEq  40 mEq Oral Q4H    budesonide (PULMICORT) nebulizer suspension 250 mcg  0.25 mg Nebulization BID RT    And    arformoterol tartrate (BROVANA) nebulizer solution 15 mcg  15 mcg Nebulization BID RT    And    ipratropium (ATROVENT) 0.02 %

## 2025-07-01 NOTE — THERAPY EVALUATION
ACUTE OCCUPATIONAL THERAPY GOALS:   (Developed with and agreed upon by patient and/or caregiver.)  1. Patient will complete lower body bathing and dressing with INDEPENDENCE and adaptive equipment as needed.     2. Patient will complete toilet transfers and toileting with INDEPENDENCE.  3. Patient will complete self-grooming ADL tasks at standing level with INDEPENDENCE.  4. Patient will tolerate 30 minutes of OT treatment with 1-2 rest breaks to increase activity tolerance for ADLs.   5. Patient will complete functional transfers with INDEPENDENCE and adaptive equipment as needed.   6. Patient will tolerate 10 minutes BUE exercises to increase strength for safe, functional transfers.     Timeframe: 7 visits        OCCUPATIONAL THERAPY Initial Assessment, Daily Note, and AM       OT Visit Days: 1  Acknowledge Orders  Time  OT Charge Capture  Rehab Caseload Tracker      Sin Narayan is a 70 y.o. male   PRIMARY DIAGNOSIS: Hyperkalemia  Hyperkalemia [E87.5]  Chronic kidney disease, unspecified CKD stage [N18.9]       Reason for Referral: Generalized Muscle Weakness (M62.81)  Other lack of cordination (R27.8)  Inpatient: Payor: [employer] MEDICARE / Plan: [employer] GOLD PLUS HMO / Product Type: *No Product type* /     ASSESSMENT:     REHAB RECOMMENDATIONS:   Recommendation to date pending progress:  Setting:  Cardiac Rehab    Equipment:   To Be Determined     ASSESSMENT:  Mr. Narayan is a 69 y/o M who presents to the hospital with generalized weakness, admitted for hyperkalemia. PMHx significant for V fib s/p ICD placed on 4/8/25.    Today, pt is received supine in bed, agreeable to participate in the session. Per patient report, he lives with and cares for his mother in a two level home with bedroom/bathroom located upstairs. He is independent with all ADLs/IADLs without assistive device for mobility at baseline. Reports 1 falls within the past six months. This date, pt performed bed mobility transfers/supine to sit

## 2025-07-01 NOTE — CARE COORDINATION
CM spoke to patient at bedside on this day. Patient confirmed demographic information and insurance information. Patient reports that he lives with with his 90 year old mother and takes care of her. Patient reports that he is independent with ADLs, uses no DMEs, and is an active . Patient has no home care services. Patient confirmed PCP information and last PCP visit was 3 weeks ago. PT/OT are recommending outpatient cardiac rehab at this time. Patient is agreeable to return home at time of discharge and stated that his sister will transport him home at the appropriate time.     CM will continue to follow patient for any discharge planning needs.     ASSESSMENT NOTE    Attending Physician: Mechelle Olivier MD  Admit Problem: Hyperkalemia [E87.5]  Chronic kidney disease, unspecified CKD stage [N18.9]  Date/Time of Admission: 6/30/2025 12:50 PM  Problem List:  Patient Active Problem List   Diagnosis    History of CVA (cerebrovascular accident) without residual deficits    Recurrent acute deep vein thrombosis (DVT) of lower extremity (HCC)    Essential hypertension    Mixed hyperlipidemia    Hyperuricemia    Controlled type 2 diabetes mellitus with stage 4 chronic kidney disease, without long-term current use of insulin (HCC)    Breast pain, left    Left breast mass    Gynecomastia    Primary hyperparathyroidism    Stage 4 chronic kidney disease (HCC)    Factor V Leiden    Iron deficiency anemia due to chronic blood loss    Coronary artery disease of native artery of native heart with stable angina pectoris    Acute on chronic systolic heart failure (HCC)    Paroxysmal atrial fibrillation (HCC)    Hyperkalemia    Moderate protein-calorie malnutrition    Hypokalemia       Service Assessment  Patient Orientation Alert and Oriented   Cognition Alert   History Provided By Patient   Primary Caregiver Self   Accompanied By/Relationship     Support Systems Family Members   Patient's Healthcare Decision Maker is: Legal

## 2025-07-01 NOTE — ACP (ADVANCE CARE PLANNING)
Advance Care Planning     Advance Care Planning Inpatient Note  [facility] Department    Today's Date: 7/1/2025  Unit: [facility] CV INTENSIVE CARE    Received request from IDT Member.  Upon review of chart and communication with care team, patient's decision making abilities are not in question.. Patient was/were present in the room during visit.    Goals of ACP Conversation:  Discuss advance care planning documents    Health Care Decision Makers:       Primary Decision Maker: Татьяна Parra - Brother/Sister - [phone]  Summary:  Verified Healthcare Decision Maker    Advance Care Planning Documents (Patient Wishes):  None     Assessment:   received consult for recommended HCPOA for patient.  Patient has stated that he wants his sister (listed above) to be his healthcare decision maker if he is incapacitated, but declined to complete a HCPOA.  Patient states he has 2 sons that live out of state and he lives in the home with his mother who he cares for.  Patient expressed understanding that the hospital would have to consult the legal next of kin for certain healthcare decisions, unless he names a different agent in HCPOA paperwork.  Patient expressed understanding, but still states that he does not want to complete paperwork.    Interventions:  Discussed and provided education on state decision maker hierarchy  Encouraged ongoing ACP conversation with future decision makers and loved ones  Patient DECLINED ACP conversation    Care Preferences Communicated:   No    Outcomes/Plan:  ACP Discussion: Refused    Electronically signed by Chaplain Olivia on 7/1/2025 at 10:17 AM

## 2025-07-01 NOTE — PLAN OF CARE
Problem: Chronic Conditions and Co-morbidities  Goal: Patient's chronic conditions and co-morbidity symptoms are monitored and maintained or improved  7/1/2025 1024 by Roscoe Gaona RN  Outcome: Progressing  Flowsheets (Taken 7/1/2025 0707)  Care Plan - Patient's Chronic Conditions and Co-Morbidity Symptoms are Monitored and Maintained or Improved:   Monitor and assess patient's chronic conditions and comorbid symptoms for stability, deterioration, or improvement   Collaborate with multidisciplinary team to address chronic and comorbid conditions and prevent exacerbation or deterioration  6/30/2025 2058 by Stefani Whyte, RN  Outcome: Progressing  Flowsheets (Taken 6/30/2025 1915)  Care Plan - Patient's Chronic Conditions and Co-Morbidity Symptoms are Monitored and Maintained or Improved:   Monitor and assess patient's chronic conditions and comorbid symptoms for stability, deterioration, or improvement   Collaborate with multidisciplinary team to address chronic and comorbid conditions and prevent exacerbation or deterioration   Update acute care plan with appropriate goals if chronic or comorbid symptoms are exacerbated and prevent overall improvement and discharge     Problem: Discharge Planning  Goal: Discharge to home or other facility with appropriate resources  7/1/2025 1024 by Roscoe Gaona RN  Outcome: Progressing  Flowsheets (Taken 7/1/2025 0707)  Discharge to home or other facility with appropriate resources:   Identify barriers to discharge with patient and caregiver   Arrange for needed discharge resources and transportation as appropriate   Identify discharge learning needs (meds, wound care, etc)  6/30/2025 2058 by Stefani Whyte, RN  Outcome: Progressing  Flowsheets (Taken 6/30/2025 1915)  Discharge to home or other facility with appropriate resources:   Identify barriers to discharge with patient and caregiver   Arrange for needed discharge resources and transportation as appropriate

## 2025-07-01 NOTE — INTERDISCIPLINARY ROUNDS
Multi-D Rounds/Checklist (leapfrog):  Lines: can any be removed?: None      DVT Prophylaxis: Ordered  Vent: N/A  Nutrition Ordered/appropriate: Ordered  Can antibiotics or other drugs be stopped? N/A Yes/No  MRSA swab:   Inpat Anti-Infectives (From admission, onward)      None          Consults needed: nephro  A: Is pain control adequate? (has PRNs? Stop drip?) Yes  B: Sedation break and SBT? N/A  C: Is sedation choice appropriate? N/A  D: Delirium/CAM-ICU? No  E: Mobility goals/appropriateness? Yes  F: Family update and plan? sister is surrogate decision maker and is being updated daily by primary attending and nursing staff.    Cheryl Kim, APRN - CNP

## 2025-07-02 VITALS
WEIGHT: 121.69 LBS | TEMPERATURE: 98 F | DIASTOLIC BLOOD PRESSURE: 57 MMHG | HEART RATE: 75 BPM | BODY MASS INDEX: 17.42 KG/M2 | RESPIRATION RATE: 22 BRPM | HEIGHT: 70 IN | OXYGEN SATURATION: 95 % | SYSTOLIC BLOOD PRESSURE: 100 MMHG

## 2025-07-02 LAB
ANION GAP SERPL CALC-SCNC: 13 MMOL/L (ref 7–16)
BASOPHILS # BLD: 0.02 K/UL (ref 0–0.2)
BASOPHILS NFR BLD: 0.3 % (ref 0–2)
BUN SERPL-MCNC: 61 MG/DL (ref 8–23)
CALCIUM SERPL-MCNC: 9.5 MG/DL (ref 8.8–10.2)
CHLORIDE SERPL-SCNC: 101 MMOL/L (ref 98–107)
CO2 SERPL-SCNC: 23 MMOL/L (ref 20–29)
CREAT SERPL-MCNC: 2.96 MG/DL (ref 0.8–1.3)
DIFFERENTIAL METHOD BLD: ABNORMAL
EOSINOPHIL # BLD: 0.17 K/UL (ref 0–0.8)
EOSINOPHIL NFR BLD: 2.6 % (ref 0.5–7.8)
ERYTHROCYTE [DISTWIDTH] IN BLOOD BY AUTOMATED COUNT: 18.3 % (ref 11.9–14.6)
GLUCOSE BLD STRIP.AUTO-MCNC: 126 MG/DL (ref 65–100)
GLUCOSE BLD STRIP.AUTO-MCNC: 134 MG/DL (ref 65–100)
GLUCOSE SERPL-MCNC: 123 MG/DL (ref 70–99)
HCT VFR BLD AUTO: 37.7 % (ref 41.1–50.3)
HGB BLD-MCNC: 11.6 G/DL (ref 13.6–17.2)
IMM GRANULOCYTES # BLD AUTO: 0.01 K/UL (ref 0–0.5)
IMM GRANULOCYTES NFR BLD AUTO: 0.2 % (ref 0–5)
LYMPHOCYTES # BLD: 1.88 K/UL (ref 0.5–4.6)
LYMPHOCYTES NFR BLD: 28.7 % (ref 13–44)
MAGNESIUM SERPL-MCNC: 2.1 MG/DL (ref 1.8–2.4)
MCH RBC QN AUTO: 26 PG (ref 26.1–32.9)
MCHC RBC AUTO-ENTMCNC: 30.8 G/DL (ref 31.4–35)
MCV RBC AUTO: 84.5 FL (ref 82–102)
MONOCYTES # BLD: 0.56 K/UL (ref 0.1–1.3)
MONOCYTES NFR BLD: 8.6 % (ref 4–12)
NEUTS SEG # BLD: 3.9 K/UL (ref 1.7–8.2)
NEUTS SEG NFR BLD: 59.6 % (ref 43–78)
NRBC # BLD: 0 K/UL (ref 0–0.2)
PLATELET # BLD AUTO: 178 K/UL (ref 150–450)
PMV BLD AUTO: 10.3 FL (ref 9.4–12.3)
POTASSIUM SERPL-SCNC: 3.3 MMOL/L (ref 3.5–5.1)
POTASSIUM SERPL-SCNC: 4 MMOL/L (ref 3.5–5.1)
RBC # BLD AUTO: 4.46 M/UL (ref 4.23–5.6)
SERVICE CMNT-IMP: ABNORMAL
SERVICE CMNT-IMP: ABNORMAL
SODIUM SERPL-SCNC: 138 MMOL/L (ref 136–145)
WBC # BLD AUTO: 6.5 K/UL (ref 4.3–11.1)

## 2025-07-02 PROCEDURE — 80048 BASIC METABOLIC PNL TOTAL CA: CPT

## 2025-07-02 PROCEDURE — 94761 N-INVAS EAR/PLS OXIMETRY MLT: CPT

## 2025-07-02 PROCEDURE — 6370000000 HC RX 637 (ALT 250 FOR IP): Performed by: FAMILY MEDICINE

## 2025-07-02 PROCEDURE — 85025 COMPLETE CBC W/AUTO DIFF WBC: CPT

## 2025-07-02 PROCEDURE — 84132 ASSAY OF SERUM POTASSIUM: CPT

## 2025-07-02 PROCEDURE — 94640 AIRWAY INHALATION TREATMENT: CPT

## 2025-07-02 PROCEDURE — 82962 GLUCOSE BLOOD TEST: CPT

## 2025-07-02 PROCEDURE — 36415 COLL VENOUS BLD VENIPUNCTURE: CPT

## 2025-07-02 PROCEDURE — 83735 ASSAY OF MAGNESIUM: CPT

## 2025-07-02 PROCEDURE — 97530 THERAPEUTIC ACTIVITIES: CPT

## 2025-07-02 PROCEDURE — 6360000002 HC RX W HCPCS: Performed by: FAMILY MEDICINE

## 2025-07-02 PROCEDURE — 2500000003 HC RX 250 WO HCPCS: Performed by: FAMILY MEDICINE

## 2025-07-02 RX ORDER — POTASSIUM CHLORIDE 1500 MG/1
40 TABLET, EXTENDED RELEASE ORAL EVERY 4 HOURS
Status: COMPLETED | OUTPATIENT
Start: 2025-07-02 | End: 2025-07-02

## 2025-07-02 RX ORDER — POTASSIUM CHLORIDE 1500 MG/1
40 TABLET, EXTENDED RELEASE ORAL ONCE
Status: DISCONTINUED | OUTPATIENT
Start: 2025-07-02 | End: 2025-07-02

## 2025-07-02 RX ADMIN — BUDESONIDE 250 MCG: 0.25 SUSPENSION RESPIRATORY (INHALATION) at 09:14

## 2025-07-02 RX ADMIN — PANTOPRAZOLE SODIUM 40 MG: 40 TABLET, DELAYED RELEASE ORAL at 06:27

## 2025-07-02 RX ADMIN — ATORVASTATIN CALCIUM 80 MG: 80 TABLET, FILM COATED ORAL at 08:43

## 2025-07-02 RX ADMIN — POTASSIUM CHLORIDE 40 MEQ: 1500 TABLET, EXTENDED RELEASE ORAL at 09:55

## 2025-07-02 RX ADMIN — ARFORMOTEROL TARTRATE 15 MCG: 15 SOLUTION RESPIRATORY (INHALATION) at 09:14

## 2025-07-02 RX ADMIN — CLOPIDOGREL BISULFATE 75 MG: 75 TABLET, FILM COATED ORAL at 08:43

## 2025-07-02 RX ADMIN — POTASSIUM CHLORIDE 40 MEQ: 1500 TABLET, EXTENDED RELEASE ORAL at 07:34

## 2025-07-02 RX ADMIN — SODIUM CHLORIDE, PRESERVATIVE FREE 10 ML: 5 INJECTION INTRAVENOUS at 07:35

## 2025-07-02 RX ADMIN — FERROUS GLUCONATE 324 MG: 324 TABLET ORAL at 08:43

## 2025-07-02 RX ADMIN — APIXABAN 5 MG: 5 TABLET, FILM COATED ORAL at 08:43

## 2025-07-02 ASSESSMENT — PAIN SCALES - GENERAL
PAINLEVEL_OUTOF10: 0
PAINLEVEL_OUTOF10: 0

## 2025-07-02 NOTE — CARE COORDINATION
Patient has discharge orders to go home today. Patient is aware and in agreement with this discharge plan today.     Per RN, patient does not need any home health care services.     No CM needs voiced or noted.     ASSESSMENT NOTE    Attending Physician: Mechelle Olivier MD  Admit Problem: Hyperkalemia [E87.5]  Chronic kidney disease, unspecified CKD stage [N18.9]  Date/Time of Admission: 6/30/2025 12:50 PM  Problem List:  Patient Active Problem List   Diagnosis    History of CVA (cerebrovascular accident) without residual deficits    Recurrent acute deep vein thrombosis (DVT) of lower extremity (HCC)    Essential hypertension    Mixed hyperlipidemia    Hyperuricemia    Controlled type 2 diabetes mellitus with stage 4 chronic kidney disease, without long-term current use of insulin (HCC)    Breast pain, left    Left breast mass    Gynecomastia    Primary hyperparathyroidism    Stage 4 chronic kidney disease (HCC)    Factor V Leiden    Iron deficiency anemia due to chronic blood loss    Coronary artery disease of native artery of native heart with stable angina pectoris    Acute on chronic systolic heart failure (HCC)    Paroxysmal atrial fibrillation (HCC)    Hyperkalemia    Moderate protein-calorie malnutrition    Hypokalemia       Service Assessment  Patient Orientation Alert and Oriented   Cognition Alert   History Provided By Patient   Primary Caregiver Self   Accompanied By/Relationship     Support Systems Family Members   Patient's Healthcare Decision Maker is: Legal Next of Kin   PCP Verified by CM Yes (confirmed PCP is Dr. Lety Gonzales)   Last Visit to PCP Within last 3 months (last PCP visit was 3 weeks ago)   Prior Functional Level Independent in ADLs/IADLs   Current Functional Level Other (see comment) (TBD by clinicals)   Can patient return to prior living arrangement Yes   Ability to make needs known: Good   Family able to assist with home care needs: Yes   Would you like for me to discuss the

## 2025-07-02 NOTE — PLAN OF CARE
Problem: Chronic Conditions and Co-morbidities  Goal: Patient's chronic conditions and co-morbidity symptoms are monitored and maintained or improved  7/2/2025 0933 by Roscoe Gaona RN  Outcome: Progressing  Flowsheets (Taken 7/2/2025 0721)  Care Plan - Patient's Chronic Conditions and Co-Morbidity Symptoms are Monitored and Maintained or Improved:   Monitor and assess patient's chronic conditions and comorbid symptoms for stability, deterioration, or improvement   Collaborate with multidisciplinary team to address chronic and comorbid conditions and prevent exacerbation or deterioration  7/1/2025 2248 by Charmaine Landis RN  Outcome: Progressing     Problem: Discharge Planning  Goal: Discharge to home or other facility with appropriate resources  7/2/2025 0933 by Roscoe Gaona RN  Outcome: Progressing  Flowsheets (Taken 7/2/2025 0721)  Discharge to home or other facility with appropriate resources:   Identify barriers to discharge with patient and caregiver   Identify discharge learning needs (meds, wound care, etc)   Arrange for needed discharge resources and transportation as appropriate  7/1/2025 2248 by Charmaine Landis RN  Outcome: Progressing     Problem: Pain  Goal: Verbalizes/displays adequate comfort level or baseline comfort level  7/2/2025 0933 by Roscoe Gaona RN  Outcome: Progressing  Flowsheets (Taken 7/2/2025 0721)  Verbalizes/displays adequate comfort level or baseline comfort level:   Assess pain using appropriate pain scale   Administer analgesics based on type and severity of pain and evaluate response   Encourage patient to monitor pain and request assistance  7/1/2025 2248 by Charmaine Landis RN  Outcome: Progressing     Problem: Safety - Adult  Goal: Free from fall injury  7/2/2025 0933 by Roscoe Gaona RN  Outcome: Progressing  7/1/2025 2248 by Charmaine Landis RN  Outcome: Progressing     Problem: ABCDS Injury Assessment  Goal: Absence of physical injury  7/2/2025 0933 by Roscoe Gaona,

## 2025-07-02 NOTE — DISCHARGE INSTRUCTIONS
F/u PCP in a week - with cbc,bmp and magnesium.  Advised to follow up with pts cardiology in a week.  Take home dose of potassium for next 2 days and after that take home dose of potassium only when Torsemide (DEMADEX) is taken.  Take Demedex only if systolic bp greater then 100 mmhg.       home

## 2025-07-02 NOTE — INTERDISCIPLINARY ROUNDS
Multi-D Rounds/Checklist (leapfrog):  Lines: can any be removed?: None      DVT Prophylaxis: Ordered  Vent: N/A  Nutrition Ordered/appropriate: Ordered  Can antibiotics or other drugs be stopped? N/A Yes/No  MRSA swab:   Inpat Anti-Infectives (From admission, onward)      None          Consults needed: None  A: Is pain control adequate? (has PRNs? Stop drip?) Yes  B: Sedation break and SBT? N/A  C: Is sedation choice appropriate? N/A  D: Delirium/CAM-ICU? No  E: Mobility goals/appropriateness? Yes  F: Family update and plan? sister is surrogate decision maker and is being updated daily by primary attending and nursing staff.    Cheryl Kim, APRN - CNP

## 2025-07-02 NOTE — DISCHARGE SUMMARY
Hospitalist Discharge Summary   Admit Date:  2025 12:50 PM   DC Note date: 2025  Name:  Sin Narayan   Age:  70 y.o.  Sex:  male  :  1955   MRN:  [id number]   Room:  [facility]/  PCP:  Lety Gonzales MD    Presenting Complaint: Fatigue     Initial Admission Diagnosis: Hyperkalemia [E87.5]  Chronic kidney disease, unspecified CKD stage [N18.9]     Problem List for this Hospitalization (present on admission):    Principal Problem:    Hyperkalemia  Active Problems:    Factor V Leiden    Iron deficiency anemia due to chronic blood loss    Coronary artery disease of native artery of native heart with stable angina pectoris    Paroxysmal atrial fibrillation (HCC)    Essential hypertension    Mixed hyperlipidemia    Stage 4 chronic kidney disease (HCC)    Moderate protein-calorie malnutrition    Hypokalemia  Resolved Problems:    * No resolved hospital problems. *    H&P  Sin Narayan is a 70 y.o. male who presented to the ED for cc generalized weakness since having an MI March of this year. Nothing seems to make better or worse. K found to be 8.3.      Hx of CAD, V fib s/p ICD placed on 25, DM type II, HLD, HTN, factor 5 Leiden on Eliquis, post infarction pericarditis,sCHF EF 35%, CKD IV with a non functional left kidney followed at [facility]  Hospital Course:  Patient was initially admitted for hyperkalemia, acute on chronic kidney disease stage IV.  Patient was treated for hyperkalemia then was found to be hypokalemic, and retrospectively felt that initial hyperkalemia results were probably an error.  Continued on fluids, patient Demadex was held, nephrology was also consulted.  Patient hypokalemia was replaced, improving creatinine on IV fluids.    At discharge patient potassium of 4 was a hemolyzed sample, creatinine of 2.96, GFR of 22.  Patient does take oral potassium supplementation at home.  Nephrology was okay for patient to be discharged.    Patient is clinically stable for

## 2025-07-03 ENCOUNTER — CARE COORDINATION (OUTPATIENT)
Dept: CARE COORDINATION | Facility: CLINIC | Age: 70
End: 2025-07-03

## 2025-07-03 DIAGNOSIS — E87.6 HYPOKALEMIA: Primary | ICD-10-CM

## 2025-07-03 PROCEDURE — 1111F DSCHRG MED/CURRENT MED MERGE: CPT | Performed by: FAMILY MEDICINE

## 2025-07-03 NOTE — CARE COORDINATION
Care Transitions Note    Initial Call - Call within 2 business days of discharge: Yes    Patient Current Location:  Home: [address] [place] [id number]    Care Transition Nurse contacted the patient by telephone to perform post hospital discharge assessment, verified name and  as identifiers.  Provided introduction to self, and explanation of the Care Transition Nurse role.    Patient: Sin Narayan    Patient : 1955   MRN: [id number]    Reason for Admission: hypokalemia  Discharge Date: 25  RURS: Readmission Risk Score: 15.5      Last Discharge Facility       Date Complaint Diagnosis Description Type Department Provider    25 Fatigue Hyperkalemia ... ED to Hosp-Admission (Discharged) (ADMITTED) [id number] Mechelle Olivier MD; Zachery Sandhu...            Was this an external facility discharge? No    Additional needs identified to be addressed with provider   No needs identified             Method of communication with provider: none.    Patients top risk factors for readmission: medical condition-hypokalemia    Interventions to address risk factors:   Review of patient management of conditions/medications: reviewed recent discharge instructions, reviewed medications and instructions post d/c.    Care Summary Note: Patient home after recent IP admission for hypokalemia. Patient indicated he was unclear on medications and instructions. CTN reviewed instructions for potassium and torsemide with patient to clarify. Patient indicated he does not have torsemide at home. This medications was ordered in May 25 and patient has 5 refills remaining. CTN encouraged patient call [facility] ([facility]) where medications was E-scribed.  Patient was appreciative for clarification. Patient has cardiology follow up scheduled for today and future follow up discussed. Patient denies any further needs/concerns and continues to monitor VS at home. Patient noted to rarely have SBP > 100, asymptomatic. Torsemide

## 2025-07-10 ENCOUNTER — CARE COORDINATION (OUTPATIENT)
Dept: CARE COORDINATION | Facility: CLINIC | Age: 70
End: 2025-07-10

## 2025-07-10 NOTE — CARE COORDINATION
Care Transitions Note    Follow Up Call     Patient Current Location:  Home: [place] [address]    LPN Care Coordinator contacted the patient by telephone. Verified name and  as identifiers.    Additional needs identified to be addressed with provider   No needs identified                 Method of communication with provider: none.    Care Summary Note: Spoke with patient states doing fine. Patient denies chest pain, shortness of breath,generalized weakness  or heart racing. Patient states will follow up with the device clinic on 2025. Will follow up with patient in the next couple of weeks for acute needs.    Plan of care updates since last contact:  Review of patient management of conditions/medications: diet, hydration, medication compliance and follow up appointment       Advance Care Planning:   Does patient have an Advance Directive: reviewed and current.    Medication Review:  No changes since last call.     Remote Patient Monitoring:  Offered patient enrollment in the Remote Patient Monitoring (RPM) program for in-home monitoring: Patient is not eligible for RPM program because: patient does not have qualifying diagnosis.    Assessments:   Goals Addressed                   This Visit's Progress     Knowledge and adherence medication (ie. action, side effects, missed dose, etc.)                Follow Up Appointment:   Reviewed upcoming appointment(s).  Future Appointments         Provider Specialty Dept Phone    2025 12:45 PM [facility] Cardiology [phone]    2025 2:40 PM Lety Gonzales MD Family Medicine [phone]            LPN Care Coordinator provided contact information.  Plan for follow-up call in 11-14 days based on severity of symptoms and risk factors.  Plan for next call: Review device clinic     Viji Cotto LPN

## 2025-07-15 ASSESSMENT — PATIENT HEALTH QUESTIONNAIRE - PHQ9
SUM OF ALL RESPONSES TO PHQ QUESTIONS 1-9: 0
SUM OF ALL RESPONSES TO PHQ QUESTIONS 1-9: 0
1. LITTLE INTEREST OR PLEASURE IN DOING THINGS: NOT AT ALL
1. LITTLE INTEREST OR PLEASURE IN DOING THINGS: NOT AT ALL
2. FEELING DOWN, DEPRESSED OR HOPELESS: NOT AT ALL
SUM OF ALL RESPONSES TO PHQ QUESTIONS 1-9: 0
SUM OF ALL RESPONSES TO PHQ QUESTIONS 1-9: 0
2. FEELING DOWN, DEPRESSED OR HOPELESS: NOT AT ALL
SUM OF ALL RESPONSES TO PHQ9 QUESTIONS 1 & 2: 0

## 2025-07-16 ENCOUNTER — TELEPHONE (OUTPATIENT)
Age: 70
End: 2025-07-16

## 2025-07-16 ENCOUNTER — OFFICE VISIT (OUTPATIENT)
Dept: FAMILY MEDICINE CLINIC | Facility: CLINIC | Age: 70
End: 2025-07-16

## 2025-07-16 VITALS
TEMPERATURE: 97.9 F | SYSTOLIC BLOOD PRESSURE: 104 MMHG | OXYGEN SATURATION: 96 % | BODY MASS INDEX: 18.54 KG/M2 | DIASTOLIC BLOOD PRESSURE: 62 MMHG | WEIGHT: 129.5 LBS | HEART RATE: 82 BPM | HEIGHT: 70 IN

## 2025-07-16 DIAGNOSIS — E87.6 HYPOKALEMIA: ICD-10-CM

## 2025-07-16 DIAGNOSIS — I10 ESSENTIAL HYPERTENSION: ICD-10-CM

## 2025-07-16 DIAGNOSIS — E11.22 CONTROLLED TYPE 2 DIABETES MELLITUS WITH STAGE 4 CHRONIC KIDNEY DISEASE, WITHOUT LONG-TERM CURRENT USE OF INSULIN (HCC): ICD-10-CM

## 2025-07-16 DIAGNOSIS — Z09 HOSPITAL DISCHARGE FOLLOW-UP: Primary | ICD-10-CM

## 2025-07-16 DIAGNOSIS — E78.2 MIXED HYPERLIPIDEMIA: ICD-10-CM

## 2025-07-16 DIAGNOSIS — I25.118 CORONARY ARTERY DISEASE OF NATIVE ARTERY OF NATIVE HEART WITH STABLE ANGINA PECTORIS: ICD-10-CM

## 2025-07-16 DIAGNOSIS — N18.4 STAGE 4 CHRONIC KIDNEY DISEASE (HCC): ICD-10-CM

## 2025-07-16 DIAGNOSIS — E79.0 HYPERURICEMIA: ICD-10-CM

## 2025-07-16 DIAGNOSIS — N18.4 CONTROLLED TYPE 2 DIABETES MELLITUS WITH STAGE 4 CHRONIC KIDNEY DISEASE, WITHOUT LONG-TERM CURRENT USE OF INSULIN (HCC): ICD-10-CM

## 2025-07-16 LAB
ALBUMIN SERPL-MCNC: 3.2 G/DL (ref 3.2–4.6)
ALBUMIN/GLOB SERPL: 0.7 (ref 1–1.9)
ALP SERPL-CCNC: 169 U/L (ref 40–129)
ALT SERPL-CCNC: 17 U/L (ref 8–55)
ANION GAP SERPL CALC-SCNC: 14 MMOL/L (ref 7–16)
AST SERPL-CCNC: 21 U/L (ref 15–37)
BASOPHILS # BLD: 0.07 K/UL (ref 0–0.2)
BASOPHILS NFR BLD: 0.8 % (ref 0–2)
BILIRUB SERPL-MCNC: 0.3 MG/DL (ref 0–1.2)
BUN SERPL-MCNC: 39 MG/DL (ref 8–23)
CALCIUM SERPL-MCNC: 10 MG/DL (ref 8.8–10.2)
CHLORIDE SERPL-SCNC: 99 MMOL/L (ref 98–107)
CHOLEST SERPL-MCNC: 199 MG/DL (ref 0–200)
CO2 SERPL-SCNC: 25 MMOL/L (ref 20–29)
CREAT SERPL-MCNC: 2.65 MG/DL (ref 0.8–1.3)
DIFFERENTIAL METHOD BLD: ABNORMAL
EOSINOPHIL # BLD: 0.51 K/UL (ref 0–0.8)
EOSINOPHIL NFR BLD: 5.6 % (ref 0.5–7.8)
ERYTHROCYTE [DISTWIDTH] IN BLOOD BY AUTOMATED COUNT: 19.3 % (ref 11.9–14.6)
EST. AVERAGE GLUCOSE BLD GHB EST-MCNC: 128 MG/DL
GLOBULIN SER CALC-MCNC: 4.3 G/DL (ref 2.3–3.5)
GLUCOSE SERPL-MCNC: 85 MG/DL (ref 70–99)
HBA1C MFR BLD: 6.1 % (ref 0–5.6)
HCT VFR BLD AUTO: 47 % (ref 41.1–50.3)
HDLC SERPL-MCNC: 80 MG/DL (ref 40–60)
HDLC SERPL: 2.5 (ref 0–5)
HGB BLD-MCNC: 13.8 G/DL (ref 13.6–17.2)
IMM GRANULOCYTES # BLD AUTO: 0.04 K/UL (ref 0–0.5)
IMM GRANULOCYTES NFR BLD AUTO: 0.4 % (ref 0–5)
LDLC SERPL CALC-MCNC: 101 MG/DL (ref 0–100)
LYMPHOCYTES # BLD: 2.03 K/UL (ref 0.5–4.6)
LYMPHOCYTES NFR BLD: 22.5 % (ref 13–44)
MCH RBC QN AUTO: 25.7 PG (ref 26.1–32.9)
MCHC RBC AUTO-ENTMCNC: 29.4 G/DL (ref 31.4–35)
MCV RBC AUTO: 87.7 FL (ref 82–102)
MONOCYTES # BLD: 1 K/UL (ref 0.1–1.3)
MONOCYTES NFR BLD: 11.1 % (ref 4–12)
NEUTS SEG # BLD: 5.38 K/UL (ref 1.7–8.2)
NEUTS SEG NFR BLD: 59.6 % (ref 43–78)
NRBC # BLD: 0 K/UL (ref 0–0.2)
PLATELET # BLD AUTO: 252 K/UL (ref 150–450)
PMV BLD AUTO: 10.1 FL (ref 9.4–12.3)
POTASSIUM SERPL-SCNC: 4.4 MMOL/L (ref 3.5–5.1)
PROT SERPL-MCNC: 7.5 G/DL (ref 6.3–8.2)
RBC # BLD AUTO: 5.36 M/UL (ref 4.23–5.6)
SODIUM SERPL-SCNC: 139 MMOL/L (ref 136–145)
TRIGL SERPL-MCNC: 92 MG/DL (ref 0–150)
URATE SERPL-MCNC: 9.2 MG/DL (ref 3.9–8.2)
VLDLC SERPL CALC-MCNC: 18 MG/DL (ref 6–23)
WBC # BLD AUTO: 9 K/UL (ref 4.3–11.1)

## 2025-07-16 ASSESSMENT — ENCOUNTER SYMPTOMS
WHEEZING: 0
SHORTNESS OF BREATH: 1
DIARRHEA: 0
COUGH: 1
CONSTIPATION: 0

## 2025-07-16 NOTE — TELEPHONE ENCOUNTER
----- Message from Dr. Elie Willis MD sent at 7/16/2025  2:32 PM EDT -----  Can we get him in for an office visit in the next couple weeks?    DRG  ----- Message -----  From: Lety Gonzales MD  Sent: 7/16/2025   2:15 PM EDT  To: MD Dr Lazaro Lucio - I saw Mr Narayan today. Doing well except had some K problems and admitted to Hospital.  He was told to take the torsemide only when his systolic blood pressure is above 100.  He knows now to make sure he takes the potassium with his torsemide.  Blood pressures have been running on the lower side -below 100 systolic when he takes the torsemide.  He may take the torsemide twice a week.  But denies any dizziness.  Do you want him to keep up this regime with lower bp's?  Please advise.  Thanks Lety.

## 2025-07-16 NOTE — TELEPHONE ENCOUNTER
I called the patient and informed him Dr. Willis would like to see him in office to follow up from his hospital visit.  Patient agreed and is scheduled for 7/30/2025.

## 2025-07-16 NOTE — PROGRESS NOTES
M  Take 2 tablets by mouth daily Take with torsemide     torsemide 100 MG tablet  Commonly known as: DEMADEX  Take 1 tablet by mouth daily     traZODone 50 MG tablet  Commonly known as: DESYREL  Take 1 tablet by mouth nightly For sleep     Trelegy Ellipta 100-62.5-25 MCG/ACT Aepb inhaler  Generic drug: fluticasone-umeclidin-vilant  Inhale 1 puff into the lungs daily                Medications marked \"taking\" at this time  Outpatient Medications Marked as Taking for the 7/16/25 encounter (Office Visit) with Lety Gonzales MD   Medication Sig Dispense Refill    Insulin Pen Needle (PEN NEEDLES) 30G X 5 MM MISC 1 Units by Does not apply route 3 times daily 300 each 3    HYDROcodone-acetaminophen (NORCO)  MG per tablet Take 1 tablet by mouth every 6 hours as needed for Pain. (Patient taking differently: Take 1 tablet by mouth every morning.)      atorvastatin (LIPITOR) 80 MG tablet Take 1 tablet by mouth daily 90 tablet 3    glucose monitoring kit 1 kit by Does not apply route daily 1 kit 0    Lancets MISC 1 each by Does not apply route daily 100 each 0    empagliflozin (JARDIANCE) 10 MG tablet Take 1 tablet by mouth daily 90 tablet 3    torsemide (DEMADEX) 100 MG tablet Take 1 tablet by mouth daily 30 tablet 5    potassium chloride (KLOR-CON M) 20 MEQ extended release tablet Take 2 tablets by mouth daily Take with torsemide 180 tablet 1    blood glucose monitor strips Test 3 times a day & as needed for symptoms of irregular blood glucose. Dispense sufficient amount for indicated testing frequency plus additional to accommodate PRN testing needs. 300 strip 3    traZODone (DESYREL) 50 MG tablet Take 1 tablet by mouth nightly For sleep 90 tablet 1    clopidogrel (PLAVIX) 75 MG tablet Take 1 tablet by mouth daily 90 tablet 1    apixaban (ELIQUIS) 5 MG TABS tablet Take 1 tablet by mouth 2 times daily 60 tablet 5    pantoprazole (PROTONIX) 40 MG tablet Take 1 tablet by mouth every morning (before breakfast) For

## 2025-07-17 ENCOUNTER — RESULTS FOLLOW-UP (OUTPATIENT)
Dept: FAMILY MEDICINE CLINIC | Facility: CLINIC | Age: 70
End: 2025-07-17

## 2025-07-23 ENCOUNTER — CARE COORDINATION (OUTPATIENT)
Dept: CARE COORDINATION | Facility: CLINIC | Age: 70
End: 2025-07-23

## 2025-07-23 NOTE — CARE COORDINATION
Care Transitions Note    Final Call     Patient Current Location:  Home: [place] [address]    LPN Care Coordinator contacted the patient by telephone. Verified name and  as identifiers.    Patient graduated from the Care Transitions program on 2025.  Patient/family verbalizes confidence in the ability to self-manage at this time..      Advance Care Planning:   Does patient have an Advance Directive: reviewed and current.    Handoff:   Patient was not referred to the ACM team due to no additional needs identified.       Care Summary Note: Spoke with patient states doing fine. Patient denies any acute distress during this phone call. Patient states attended hospital follow up on . Patient states instructed to take potassium with torsemide when systolic is 100 or above. Patient states will follow up with Cardiology on 2025. Patient states appreciative for follow up calls.      Assessments:  Care Transitions Subsequent and Final Call    Subsequent and Final Calls  Do you have any ongoing symptoms?: No  Have your medications changed?: No  Do you have any questions related to your medications?: No  Do you currently have any active services?: No  Do you have any needs or concerns that I can assist you with?: No  Identified Barriers: None  Care Transitions Interventions  No Identified Needs  Disease Specific Clinic: Completed      Other Interventions:              Upcoming Appointments:    Future Appointments         Provider Specialty Dept Phone    2025 10:15 AM Elie Willis MD Cardiology [phone]    2025 12:45 PM [facility] Cardiology [phone]    10/16/2025 1:00 PM Lety Gonzales MD Family Medicine [phone]            Patient has agreed to contact primary care provider and/or specialist for any further questions, concerns, or needs.    Viji Cotto LPN

## 2025-07-24 ENCOUNTER — TELEPHONE (OUTPATIENT)
Dept: FAMILY MEDICINE CLINIC | Facility: CLINIC | Age: 70
End: 2025-07-24

## 2025-07-24 NOTE — TELEPHONE ENCOUNTER
----- Message from Dr. Lety Gonzales MD sent at 7/24/2025  7:03 AM EDT -----  Please tell pt to cut his torsemide in half - 50 mg daily instead of 100 mg.  This is per cards - they will arrange f/u for him.

## 2025-07-24 NOTE — TELEPHONE ENCOUNTER
Patient notified per Dr Gonzales:tell pt to cut his torsemide in half - 50 mg daily instead of 100 mg.  This is per cards - they will arrange f/u for him. Patient understands. Has an appt with Cardio

## 2025-07-30 ENCOUNTER — OFFICE VISIT (OUTPATIENT)
Age: 70
End: 2025-07-30
Payer: MEDICARE

## 2025-07-30 VITALS
WEIGHT: 130.8 LBS | HEART RATE: 60 BPM | HEIGHT: 68 IN | SYSTOLIC BLOOD PRESSURE: 102 MMHG | BODY MASS INDEX: 19.82 KG/M2 | DIASTOLIC BLOOD PRESSURE: 60 MMHG

## 2025-07-30 DIAGNOSIS — I48.0 PAROXYSMAL ATRIAL FIBRILLATION (HCC): Primary | ICD-10-CM

## 2025-07-30 DIAGNOSIS — I10 ESSENTIAL HYPERTENSION: ICD-10-CM

## 2025-07-30 DIAGNOSIS — E78.2 MIXED HYPERLIPIDEMIA: ICD-10-CM

## 2025-07-30 DIAGNOSIS — I25.118 CORONARY ARTERY DISEASE OF NATIVE ARTERY OF NATIVE HEART WITH STABLE ANGINA PECTORIS: ICD-10-CM

## 2025-07-30 DIAGNOSIS — N18.4 STAGE 4 CHRONIC KIDNEY DISEASE (HCC): ICD-10-CM

## 2025-07-30 PROBLEM — I50.22 CHRONIC SYSTOLIC HEART FAILURE (HCC): Status: ACTIVE | Noted: 2025-05-29

## 2025-07-30 PROCEDURE — 3078F DIAST BP <80 MM HG: CPT | Performed by: INTERNAL MEDICINE

## 2025-07-30 PROCEDURE — 1111F DSCHRG MED/CURRENT MED MERGE: CPT | Performed by: INTERNAL MEDICINE

## 2025-07-30 PROCEDURE — 3074F SYST BP LT 130 MM HG: CPT | Performed by: INTERNAL MEDICINE

## 2025-07-30 PROCEDURE — 1036F TOBACCO NON-USER: CPT | Performed by: INTERNAL MEDICINE

## 2025-07-30 PROCEDURE — G8420 CALC BMI NORM PARAMETERS: HCPCS | Performed by: INTERNAL MEDICINE

## 2025-07-30 PROCEDURE — 1126F AMNT PAIN NOTED NONE PRSNT: CPT | Performed by: INTERNAL MEDICINE

## 2025-07-30 PROCEDURE — G8427 DOCREV CUR MEDS BY ELIG CLIN: HCPCS | Performed by: INTERNAL MEDICINE

## 2025-07-30 PROCEDURE — 1159F MED LIST DOCD IN RCRD: CPT | Performed by: INTERNAL MEDICINE

## 2025-07-30 PROCEDURE — 99215 OFFICE O/P EST HI 40 MIN: CPT | Performed by: INTERNAL MEDICINE

## 2025-07-30 PROCEDURE — 1160F RVW MEDS BY RX/DR IN RCRD: CPT | Performed by: INTERNAL MEDICINE

## 2025-07-30 PROCEDURE — 1123F ACP DISCUSS/DSCN MKR DOCD: CPT | Performed by: INTERNAL MEDICINE

## 2025-07-30 PROCEDURE — 3017F COLORECTAL CA SCREEN DOC REV: CPT | Performed by: INTERNAL MEDICINE

## 2025-07-30 ASSESSMENT — ENCOUNTER SYMPTOMS
CHEST TIGHTNESS: 0
EYES NEGATIVE: 1
ALLERGIC/IMMUNOLOGIC NEGATIVE: 1
SHORTNESS OF BREATH: 0
BACK PAIN: 0
ABDOMINAL PAIN: 0
GASTROINTESTINAL NEGATIVE: 1
PHOTOPHOBIA: 0
RESPIRATORY NEGATIVE: 1
EYE PAIN: 0

## 2025-07-30 NOTE — PROGRESS NOTES
[facility] CARDIOLOGY  [address] [place] [address], [address]  [place] [address]  PHONE: [phone]      25    NAME:  Sin Narayan  : 1955  MRN: [id number]         SUBJECTIVE:   Sin Narayan is a 70 y.o. male seen for follow up of:      Chief Complaint   Patient presents with    Coronary Artery Disease    Atrial Fibrillation        Cardiac Hx (Reviewed and summarized by me):  Followed by [facility]  1) CAD              3/31/25 - Inferior STEMI Had VT then Vfib arrest in the field prior cath with PCI to RCA (4.0x48 and 4.0 x 12 Xience. Residual 70% LAD disease (Esdras White MD)  2) ICM              Echo 25 - LVEF 45% apical HK   Echo 25 - LVEF 35-40% inferior HK MM MR and MM TR  3) Lipids - on atorvastatin 80 mg daily              24 - HDL 73, LDL 91, Trig 84, Ratio 2.5  25 - HDL 41, LDL 20, Trig 63, Ratio 1.8  4) ICD placed 25 (medtronic)  5) Course complicated by Afib  6) Factor V leiden - on eliquis hx of DVTs  7) Acute post infarction pericarditis - high dose asa  8) CKD - will be seeing nephrology in [place] - Dr. Hancock  3/28/25 - CR 3.08 GFR 21 (CKD stage IV)  25 - Cr 2.65 GFR 25   9) Tobacco abuse - quit post arrest    Admitted 25 - 25 - HyperK and AoCKD    HPI:  Seen back in May has multiple cardiac issues as reviewed above.  We set him up for cardioversion at [facility] however when he presented he was back in sinus rhythm on his own.  A repeat echocardiogram shows his EF is still reduced with inferior hypokinesis consistent with a prior right coronary infarct.  He currently denies chest pain.  I asked him to see a dentist to work on his significant dental disease he has not done that.  We had referred him to cardiac rehab and he did not follow-up with them.  He states he was told to take potassium with torsemide and has been doing it he was admitted in  with hyperkalemia.  He was discharged back on torsemide and potassium.  He had a

## 2025-07-31 ENCOUNTER — CLINICAL SUPPORT (OUTPATIENT)
Age: 70
End: 2025-07-31

## 2025-07-31 DIAGNOSIS — I49.01 CARDIAC ARREST WITH VENTRICULAR FIBRILLATION (HCC): Primary | ICD-10-CM

## 2025-07-31 DIAGNOSIS — I46.9 CARDIAC ARREST WITH VENTRICULAR FIBRILLATION (HCC): Primary | ICD-10-CM

## 2025-09-04 ENCOUNTER — TELEPHONE (OUTPATIENT)
Age: 70
End: 2025-09-04

## 2025-09-05 ENCOUNTER — HOSPITAL ENCOUNTER (EMERGENCY)
Age: 70
Discharge: HOME | End: 2025-09-05
Attending: EMERGENCY MEDICINE
Payer: MEDICARE

## 2025-09-05 ENCOUNTER — APPOINTMENT (OUTPATIENT)
Dept: GENERAL RADIOLOGY | Age: 70
End: 2025-09-05
Payer: MEDICARE

## 2025-09-05 VITALS
SYSTOLIC BLOOD PRESSURE: 102 MMHG | WEIGHT: 135 LBS | RESPIRATION RATE: 18 BRPM | HEART RATE: 94 BPM | DIASTOLIC BLOOD PRESSURE: 72 MMHG | HEIGHT: 68 IN | TEMPERATURE: 98.2 F | OXYGEN SATURATION: 99 % | BODY MASS INDEX: 20.46 KG/M2

## 2025-09-05 DIAGNOSIS — R06.00 DYSPNEA, UNSPECIFIED TYPE: Primary | ICD-10-CM

## 2025-09-05 LAB
ALBUMIN SERPL-MCNC: 3.4 G/DL (ref 3.2–4.6)
ALBUMIN/GLOB SERPL: 0.7 (ref 1–1.9)
ALP SERPL-CCNC: 190 U/L (ref 40–129)
ALT SERPL-CCNC: 17 U/L (ref 8–55)
ANION GAP SERPL CALC-SCNC: 18 MMOL/L (ref 7–16)
AST SERPL-CCNC: 27 U/L (ref 15–37)
BASOPHILS # BLD: 0.06 K/UL (ref 0–0.2)
BASOPHILS NFR BLD: 0.6 % (ref 0–2)
BILIRUB SERPL-MCNC: 0.4 MG/DL (ref 0–1.2)
BUN SERPL-MCNC: 42 MG/DL (ref 8–23)
CALCIUM SERPL-MCNC: 10 MG/DL (ref 8.8–10.2)
CHLORIDE SERPL-SCNC: 98 MMOL/L (ref 98–107)
CO2 SERPL-SCNC: 23 MMOL/L (ref 20–29)
CREAT SERPL-MCNC: 2.82 MG/DL (ref 0.8–1.3)
DIFFERENTIAL METHOD BLD: ABNORMAL
EKG ATRIAL RATE: 186 BPM
EKG DIAGNOSIS: NORMAL
EKG Q-T INTERVAL: 361 MS
EKG QRS DURATION: 89 MS
EKG QTC CALCULATION (BAZETT): 457 MS
EKG R AXIS: 109 DEGREES
EKG T AXIS: -15 DEGREES
EKG VENTRICULAR RATE: 105 BPM
EOSINOPHIL # BLD: 0.22 K/UL (ref 0–0.8)
EOSINOPHIL NFR BLD: 2.2 % (ref 0.5–7.8)
ERYTHROCYTE [DISTWIDTH] IN BLOOD BY AUTOMATED COUNT: 22.5 % (ref 11.9–14.6)
GLOBULIN SER CALC-MCNC: 4.8 G/DL (ref 2.3–3.5)
GLUCOSE SERPL-MCNC: 83 MG/DL (ref 70–99)
HCT VFR BLD AUTO: 48.9 % (ref 41.1–50.3)
HGB BLD-MCNC: 15.3 G/DL (ref 13.6–17.2)
IMM GRANULOCYTES # BLD AUTO: 0.03 K/UL (ref 0–0.5)
IMM GRANULOCYTES NFR BLD AUTO: 0.3 % (ref 0–5)
LYMPHOCYTES # BLD: 2.19 K/UL (ref 0.5–4.6)
LYMPHOCYTES NFR BLD: 21.5 % (ref 13–44)
MAGNESIUM SERPL-MCNC: 2.3 MG/DL (ref 1.8–2.4)
MCH RBC QN AUTO: 26.4 PG (ref 26.1–32.9)
MCHC RBC AUTO-ENTMCNC: 31.3 G/DL (ref 31.4–35)
MCV RBC AUTO: 84.3 FL (ref 82–102)
MONOCYTES # BLD: 1.17 K/UL (ref 0.1–1.3)
MONOCYTES NFR BLD: 11.5 % (ref 4–12)
NEUTS SEG # BLD: 6.53 K/UL (ref 1.7–8.2)
NEUTS SEG NFR BLD: 63.9 % (ref 43–78)
NRBC # BLD: 0 K/UL (ref 0–0.2)
NT PRO BNP: 4421 PG/ML (ref 0–125)
PLATELET # BLD AUTO: 265 K/UL (ref 150–450)
PMV BLD AUTO: 9.4 FL (ref 9.4–12.3)
POTASSIUM SERPL-SCNC: 3.8 MMOL/L (ref 3.5–5.1)
POTASSIUM SERPL-SCNC: 4.5 MMOL/L (ref 3.5–5.1)
POTASSIUM SERPL-SCNC: 4.6 MMOL/L (ref 3.5–5.1)
PROT SERPL-MCNC: 8.1 G/DL (ref 6.3–8.2)
RBC # BLD AUTO: 5.8 M/UL (ref 4.23–5.6)
SODIUM SERPL-SCNC: 139 MMOL/L (ref 136–145)
T4 FREE SERPL-MCNC: 1.2 NG/DL (ref 0.9–1.7)
TROPONIN T SERPL HS-MCNC: 38.3 NG/L (ref 0–22)
TROPONIN T SERPL HS-MCNC: 51.6 NG/L (ref 0–22)
TSH, 3RD GENERATION: 2.69 UIU/ML (ref 0.27–4.2)
WBC # BLD AUTO: 10.2 K/UL (ref 4.3–11.1)

## 2025-09-05 PROCEDURE — 93005 ELECTROCARDIOGRAM TRACING: CPT | Performed by: EMERGENCY MEDICINE

## 2025-09-05 PROCEDURE — 84132 ASSAY OF SERUM POTASSIUM: CPT

## 2025-09-05 PROCEDURE — 80053 COMPREHEN METABOLIC PANEL: CPT

## 2025-09-05 PROCEDURE — 85025 COMPLETE CBC W/AUTO DIFF WBC: CPT

## 2025-09-05 PROCEDURE — 71046 X-RAY EXAM CHEST 2 VIEWS: CPT

## 2025-09-05 PROCEDURE — 84439 ASSAY OF FREE THYROXINE: CPT

## 2025-09-05 PROCEDURE — 83735 ASSAY OF MAGNESIUM: CPT

## 2025-09-05 PROCEDURE — 84484 ASSAY OF TROPONIN QUANT: CPT

## 2025-09-05 PROCEDURE — 83880 ASSAY OF NATRIURETIC PEPTIDE: CPT

## 2025-09-05 PROCEDURE — 36415 COLL VENOUS BLD VENIPUNCTURE: CPT

## 2025-09-05 PROCEDURE — 84443 ASSAY THYROID STIM HORMONE: CPT

## 2025-09-05 PROCEDURE — 93010 ELECTROCARDIOGRAM REPORT: CPT | Performed by: INTERNAL MEDICINE

## 2025-09-05 ASSESSMENT — ENCOUNTER SYMPTOMS
COUGH: 0
SHORTNESS OF BREATH: 1
BACK PAIN: 0
NAUSEA: 0
SORE THROAT: 0
DIARRHEA: 0
VOMITING: 0
ABDOMINAL PAIN: 0

## 2025-09-05 ASSESSMENT — PAIN SCALES - GENERAL
PAINLEVEL_OUTOF10: 0
PAINLEVEL_OUTOF10: 0

## 2025-09-05 ASSESSMENT — PAIN - FUNCTIONAL ASSESSMENT
PAIN_FUNCTIONAL_ASSESSMENT: 0-10
PAIN_FUNCTIONAL_ASSESSMENT: 0-10